# Patient Record
Sex: FEMALE | Race: WHITE | Employment: FULL TIME | URBAN - METROPOLITAN AREA
[De-identification: names, ages, dates, MRNs, and addresses within clinical notes are randomized per-mention and may not be internally consistent; named-entity substitution may affect disease eponyms.]

---

## 2017-01-27 ENCOUNTER — APPOINTMENT (OUTPATIENT)
Dept: LAB | Age: 57
End: 2017-01-27
Attending: FAMILY MEDICINE
Payer: COMMERCIAL

## 2017-01-27 ENCOUNTER — TELEPHONE (OUTPATIENT)
Dept: FAMILY MEDICINE CLINIC | Facility: CLINIC | Age: 57
End: 2017-01-27

## 2017-01-27 PROCEDURE — 84460 ALANINE AMINO (ALT) (SGPT): CPT | Performed by: FAMILY MEDICINE

## 2017-01-27 PROCEDURE — 84450 TRANSFERASE (AST) (SGOT): CPT | Performed by: FAMILY MEDICINE

## 2017-01-27 PROCEDURE — 36415 COLL VENOUS BLD VENIPUNCTURE: CPT | Performed by: FAMILY MEDICINE

## 2017-01-27 PROCEDURE — 82947 ASSAY GLUCOSE BLOOD QUANT: CPT | Performed by: FAMILY MEDICINE

## 2017-01-27 PROCEDURE — 80061 LIPID PANEL: CPT | Performed by: FAMILY MEDICINE

## 2017-01-27 NOTE — TELEPHONE ENCOUNTER
Patient requesting refill for Simvastatin, Please call when ready  Patient states she has enough till Sunday

## 2017-01-28 ENCOUNTER — OFFICE VISIT (OUTPATIENT)
Dept: FAMILY MEDICINE CLINIC | Facility: CLINIC | Age: 57
End: 2017-01-28

## 2017-01-28 VITALS
HEART RATE: 92 BPM | WEIGHT: 136 LBS | SYSTOLIC BLOOD PRESSURE: 99 MMHG | DIASTOLIC BLOOD PRESSURE: 58 MMHG | TEMPERATURE: 97 F | HEIGHT: 62.5 IN | BODY MASS INDEX: 24.4 KG/M2

## 2017-01-28 DIAGNOSIS — J02.9 ACUTE PHARYNGITIS, UNSPECIFIED ETIOLOGY: Primary | ICD-10-CM

## 2017-01-28 DIAGNOSIS — R09.82 PND (POST-NASAL DRIP): ICD-10-CM

## 2017-01-28 DIAGNOSIS — E78.2 MIXED HYPERLIPIDEMIA: ICD-10-CM

## 2017-01-28 DIAGNOSIS — Z85.3 HISTORY OF LEFT BREAST CANCER: ICD-10-CM

## 2017-01-28 PROCEDURE — 99212 OFFICE O/P EST SF 10 MIN: CPT | Performed by: FAMILY MEDICINE

## 2017-01-28 PROCEDURE — 99214 OFFICE O/P EST MOD 30 MIN: CPT | Performed by: FAMILY MEDICINE

## 2017-01-28 RX ORDER — AMOXICILLIN 875 MG/1
875 TABLET, COATED ORAL 2 TIMES DAILY
Qty: 20 TABLET | Refills: 0 | Status: SHIPPED | OUTPATIENT
Start: 2017-01-28 | End: 2017-02-07

## 2017-01-28 RX ORDER — ATORVASTATIN CALCIUM 20 MG/1
20 TABLET, FILM COATED ORAL NIGHTLY
Qty: 90 TABLET | Refills: 1 | Status: SHIPPED | OUTPATIENT
Start: 2017-01-28 | End: 2017-08-28

## 2017-01-28 RX ORDER — FLUTICASONE PROPIONATE 50 MCG
2 SPRAY, SUSPENSION (ML) NASAL DAILY
Qty: 1 BOTTLE | Refills: 0 | Status: SHIPPED | OUTPATIENT
Start: 2017-01-28 | End: 2017-05-28

## 2017-01-28 NOTE — PROGRESS NOTES
Patient ID: Tyron Oppenheim is a 64year old female. HPI  Patient presents with:  Throat Problem: discomfort, burning sensation. Onset: 2-3 weeks     On January 4, 2017 she started radiation due to her left breast cancer.   Few days later she states sh Tonsils are normal.  She does have moderate congestion bilaterally with more crusty nasal mucus on the right side than the left side. Eyes: Conjunctivae are normal.   Neck: Normal range of motion. Neck supple. No thyromegaly present.    Cardiovascular: No persist.  Take medicine (if given) as prescribed. Approach to treatment discussed and patient/family member understands and agrees to plan.            Rach Varela,   1/28/2017

## 2017-02-28 ENCOUNTER — TELEPHONE (OUTPATIENT)
Dept: FAMILY MEDICINE CLINIC | Facility: CLINIC | Age: 57
End: 2017-02-28

## 2017-02-28 NOTE — TELEPHONE ENCOUNTER
Pt is calling want to know if Dr Lizzy kSy can recommend a ENT DR crisostomo is requesting a call back

## 2017-06-13 ENCOUNTER — TELEPHONE (OUTPATIENT)
Dept: INTERNAL MEDICINE CLINIC | Facility: CLINIC | Age: 57
End: 2017-06-13

## 2017-06-13 ENCOUNTER — OFFICE VISIT (OUTPATIENT)
Dept: FAMILY MEDICINE CLINIC | Facility: CLINIC | Age: 57
End: 2017-06-13

## 2017-06-13 VITALS
RESPIRATION RATE: 12 BRPM | HEART RATE: 68 BPM | WEIGHT: 135.63 LBS | DIASTOLIC BLOOD PRESSURE: 66 MMHG | SYSTOLIC BLOOD PRESSURE: 117 MMHG | BODY MASS INDEX: 24.33 KG/M2 | HEIGHT: 62.5 IN | TEMPERATURE: 98 F

## 2017-06-13 DIAGNOSIS — J02.9 ACUTE PHARYNGITIS, UNSPECIFIED ETIOLOGY: Primary | ICD-10-CM

## 2017-06-13 DIAGNOSIS — Z20.9 EXPOSURE TO COMMUNICABLE DISEASE: ICD-10-CM

## 2017-06-13 PROCEDURE — 99213 OFFICE O/P EST LOW 20 MIN: CPT | Performed by: FAMILY MEDICINE

## 2017-06-13 PROCEDURE — 87880 STREP A ASSAY W/OPTIC: CPT | Performed by: FAMILY MEDICINE

## 2017-06-13 RX ORDER — ATORVASTATIN CALCIUM 20 MG/1
20 TABLET, FILM COATED ORAL NIGHTLY
COMMUNITY
End: 2017-08-30

## 2017-06-13 NOTE — PROGRESS NOTES
Patient ID: Francis Cota is a 62year old female.       Telephone Encounter    Francis Cota (MR# [id number])         Telephone Encounter by Ava Osuna RN at 6/13/2017  9:21 AM      Author: Ava Osuna RN Service: (none) Author Type: Brian not sure if it is mumps but just wants to be sure that she is still immune. She has no swelling in her neck or cheeks. She knows she is being overly cautious but just wants my opinion that it is not mumps.          Wt Readings from Last 6 Encounters:  0 oriented to person, place, and time. Patient appears well-developed and well-nourished. No distress. HENT:   Head: Normocephalic.    Right Ear: Tympanic membrane, external ear and ear canal normal.   Left Ear: Tympanic membrane, external ear and ear canal

## 2017-06-13 NOTE — TELEPHONE ENCOUNTER
Actions Requested: prefers expedited appt today, no access. Scheduled 6/14/17.   Pt is asking to be added on  Situation/Background   Problem: right side sore throat, tired   Direct exposure to mumps via friend   Onset: <24 hours   Associated Symptoms: tire hours  * Severe sore throat pain  * Pus on tonsils (back of throat) and swollen neck lymph nodes (\"glands\")  * Earache also present  * Widespread rash (especially chest and abdomen)  * Diabetes mellitus or weak immune system (e.g., HIV positive, cancer c

## 2017-08-28 DIAGNOSIS — E78.2 MIXED HYPERLIPIDEMIA: ICD-10-CM

## 2017-08-30 RX ORDER — ATORVASTATIN CALCIUM 20 MG/1
TABLET, FILM COATED ORAL
Qty: 90 TABLET | Refills: 0 | Status: SHIPPED | OUTPATIENT
Start: 2017-08-30 | End: 2018-01-04

## 2017-08-30 NOTE — TELEPHONE ENCOUNTER
Cholesterol Medications  Protocol Criteria:  · Appointment scheduled in the past 12 months or in the next 3 months  · ALT & LDL on file in the past 12 months  · ALT result < 80  · LDL result <130   Recent Outpatient Visits            2 months ago Acute pha

## 2017-12-22 DIAGNOSIS — E78.2 MIXED HYPERLIPIDEMIA: ICD-10-CM

## 2017-12-29 RX ORDER — ATORVASTATIN CALCIUM 20 MG/1
TABLET, FILM COATED ORAL
Qty: 90 TABLET | Refills: 0 | Status: SHIPPED | OUTPATIENT
Start: 2017-12-29 | End: 2018-01-04

## 2018-01-04 ENCOUNTER — OFFICE VISIT (OUTPATIENT)
Dept: FAMILY MEDICINE CLINIC | Facility: CLINIC | Age: 58
End: 2018-01-04

## 2018-01-04 VITALS
WEIGHT: 138 LBS | DIASTOLIC BLOOD PRESSURE: 73 MMHG | HEIGHT: 62.5 IN | HEART RATE: 71 BPM | TEMPERATURE: 97 F | BODY MASS INDEX: 24.76 KG/M2 | SYSTOLIC BLOOD PRESSURE: 135 MMHG

## 2018-01-04 DIAGNOSIS — E78.2 MIXED HYPERLIPIDEMIA: Primary | ICD-10-CM

## 2018-01-04 DIAGNOSIS — F51.04 PSYCHOPHYSIOLOGICAL INSOMNIA: ICD-10-CM

## 2018-01-04 DIAGNOSIS — Z81.8 FAMILY HISTORY OF BIPOLAR DISORDER: ICD-10-CM

## 2018-01-04 DIAGNOSIS — Z85.3 HISTORY OF LEFT BREAST CANCER: ICD-10-CM

## 2018-01-04 DIAGNOSIS — F41.9 ANXIETY: ICD-10-CM

## 2018-01-04 PROCEDURE — 99214 OFFICE O/P EST MOD 30 MIN: CPT | Performed by: FAMILY MEDICINE

## 2018-01-04 PROCEDURE — 99212 OFFICE O/P EST SF 10 MIN: CPT | Performed by: FAMILY MEDICINE

## 2018-01-04 RX ORDER — RANITIDINE 150 MG/1
150 TABLET ORAL
COMMUNITY
End: 2019-01-15

## 2018-01-04 RX ORDER — ATORVASTATIN CALCIUM 20 MG/1
TABLET, FILM COATED ORAL
Qty: 90 TABLET | Refills: 2 | Status: SHIPPED | OUTPATIENT
Start: 2018-01-04 | End: 2018-06-18

## 2018-01-04 RX ORDER — ANASTROZOLE 1 MG/1
1 TABLET ORAL DAILY
Refills: 0 | COMMUNITY
Start: 2017-11-16 | End: 2020-12-09

## 2018-01-04 RX ORDER — TRAZODONE HYDROCHLORIDE 50 MG/1
TABLET ORAL
Qty: 60 TABLET | Refills: 3 | Status: SHIPPED | OUTPATIENT
Start: 2018-01-04 | End: 2019-01-15

## 2018-01-04 RX ORDER — ALPRAZOLAM 0.25 MG/1
0.25 TABLET ORAL NIGHTLY PRN
Qty: 30 TABLET | Refills: 0 | Status: SHIPPED | OUTPATIENT
Start: 2018-01-04 | End: 2018-03-05

## 2018-01-04 NOTE — PROGRESS NOTES
Patient ID: Val Parr is a 62year old female. HPI  Patient presents with:  Hyperlipidemia  Medication Request  Anxiety    She had cancer in 2016 of the left breast.  She at that time was taking some Xanax to help her sleep when she got anxious. K 4.3 06/01/2016    06/01/2016   CO2 30 06/01/2016   OSMOCALC 296 (H) 06/01/2016       No results found for: Kd Maizes, [address], [address], [address], [address], [address], [address] [facility], [address] [facility], [facility] [address], [facility] [address], [address] [place], [address] [address], [address] [place] [address],[address], [address], [facility] [address], [address] Tab tab  Disp:  Rfl: 0   RaNITidine HCl 150 MG Oral Tab Take 150 mg by mouth.  Disp:  Rfl:    ATORVASTATIN 20 MG Oral Tab TAKE 1 TABLET BY MOUTH NIGHTLY FOR CHOLESTEROL Disp: 90 tablet Rfl: 0   ATORVASTATIN 20 MG Oral Tab TAKE 1 TABLET BY MOUTH NIGHTLY FOR Trina but think she would be wonderful for trazodone 100 and take this every day and see how she does. I think she can stay on her melatonin as well. She is agreeable to try this.   She will see me then in 3 months for a full physical exam a week and see h

## 2018-03-05 DIAGNOSIS — F41.9 ANXIETY: ICD-10-CM

## 2018-03-05 DIAGNOSIS — F51.04 PSYCHOPHYSIOLOGICAL INSOMNIA: ICD-10-CM

## 2018-03-08 ENCOUNTER — TELEPHONE (OUTPATIENT)
Dept: FAMILY MEDICINE CLINIC | Facility: CLINIC | Age: 58
End: 2018-03-08

## 2018-03-08 RX ORDER — ALPRAZOLAM 0.25 MG/1
TABLET ORAL
Qty: 30 TABLET | Refills: 0 | Status: SHIPPED | OUTPATIENT
Start: 2018-03-08 | End: 2018-06-18

## 2018-05-30 DIAGNOSIS — E78.2 MIXED HYPERLIPIDEMIA: ICD-10-CM

## 2018-06-05 RX ORDER — ATORVASTATIN CALCIUM 20 MG/1
TABLET, FILM COATED ORAL
Qty: 90 TABLET | Refills: 0 | Status: SHIPPED | OUTPATIENT
Start: 2018-06-05 | End: 2018-06-18

## 2018-06-05 NOTE — TELEPHONE ENCOUNTER
Refilled times 1 but needs appointment before the next prescription will be filled.  Please call patient and set up a physical exam.

## 2018-06-18 ENCOUNTER — OFFICE VISIT (OUTPATIENT)
Dept: FAMILY MEDICINE CLINIC | Facility: CLINIC | Age: 58
End: 2018-06-18

## 2018-06-18 VITALS
HEART RATE: 77 BPM | WEIGHT: 132 LBS | HEIGHT: 62.5 IN | TEMPERATURE: 98 F | SYSTOLIC BLOOD PRESSURE: 127 MMHG | BODY MASS INDEX: 23.68 KG/M2 | DIASTOLIC BLOOD PRESSURE: 69 MMHG

## 2018-06-18 DIAGNOSIS — Z12.11 SCREENING FOR COLON CANCER: ICD-10-CM

## 2018-06-18 DIAGNOSIS — J06.9 UPPER RESPIRATORY TRACT INFECTION, UNSPECIFIED TYPE: Primary | ICD-10-CM

## 2018-06-18 DIAGNOSIS — F51.04 PSYCHOPHYSIOLOGICAL INSOMNIA: ICD-10-CM

## 2018-06-18 DIAGNOSIS — F41.9 ANXIETY: ICD-10-CM

## 2018-06-18 DIAGNOSIS — E78.2 MIXED HYPERLIPIDEMIA: ICD-10-CM

## 2018-06-18 PROCEDURE — 99212 OFFICE O/P EST SF 10 MIN: CPT | Performed by: FAMILY MEDICINE

## 2018-06-18 PROCEDURE — 99214 OFFICE O/P EST MOD 30 MIN: CPT | Performed by: FAMILY MEDICINE

## 2018-06-18 RX ORDER — GUAIFENESIN, PSEUDOEPHEDRINE HYDROCHLORIDE 600; 60 MG/1; MG/1
1 TABLET, EXTENDED RELEASE ORAL EVERY 12 HOURS
Qty: 20 TABLET | Refills: 0 | Status: SHIPPED | OUTPATIENT
Start: 2018-06-18 | End: 2019-01-15

## 2018-06-18 RX ORDER — AZELASTINE 1 MG/ML
2 SPRAY, METERED NASAL 2 TIMES DAILY
Qty: 1 BOTTLE | Refills: 0 | Status: SHIPPED | OUTPATIENT
Start: 2018-06-18 | End: 2019-01-15

## 2018-06-18 RX ORDER — ALPRAZOLAM 0.25 MG/1
TABLET ORAL
Qty: 30 TABLET | Refills: 0 | Status: SHIPPED | OUTPATIENT
Start: 2018-06-18 | End: 2019-08-13

## 2018-06-18 RX ORDER — ATORVASTATIN CALCIUM 20 MG/1
TABLET, FILM COATED ORAL
Qty: 90 TABLET | Refills: 2 | Status: SHIPPED | OUTPATIENT
Start: 2018-06-18 | End: 2019-08-01

## 2018-06-18 NOTE — PROGRESS NOTES
Patient ID: Sue Thomas is a 62year old female. HPI  Patient presents with: Follow - Up  Cold    2 days ago she started getting sick. She has had a stuffy nose and runny nose.   She is just taking some over-the-counter cold medicine that her hus TABLET BY MOUTH NIGHTLY FOR CHOLESTEROL Disp: 90 tablet Rfl: 0   ALPRAZOLAM 0.25 MG Oral Tab TAKE 1 TABLET BY MOUTH NIGHTLY AS NEEDED FOR SLEEP AND ANXIETY Disp: 30 tablet Rfl: 0   anastrozole 1 MG Oral Tab tab  Disp:  Rfl: 0   RaNITidine HCl 150 MG Oral T Patient has a normal mood and affect. Vitals reviewed.          ASSESSMENT/PLAN:     Diagnoses and all orders for this visit:    Upper respiratory tract infection, unspecified type  -     Azelastine HCl 0.1 % Nasal Solution; 2 sprays by Nasal route 2 (two

## 2018-07-27 ENCOUNTER — TELEPHONE (OUTPATIENT)
Dept: GASTROENTEROLOGY | Facility: CLINIC | Age: 58
End: 2018-07-27

## 2018-07-27 ENCOUNTER — OFFICE VISIT (OUTPATIENT)
Dept: GASTROENTEROLOGY | Facility: CLINIC | Age: 58
End: 2018-07-27
Payer: COMMERCIAL

## 2018-07-27 VITALS
HEART RATE: 70 BPM | SYSTOLIC BLOOD PRESSURE: 122 MMHG | DIASTOLIC BLOOD PRESSURE: 73 MMHG | BODY MASS INDEX: 24.48 KG/M2 | WEIGHT: 133 LBS | HEIGHT: 62 IN

## 2018-07-27 DIAGNOSIS — R12 HEARTBURN: ICD-10-CM

## 2018-07-27 DIAGNOSIS — Z12.11 ENCOUNTER FOR SCREENING COLONOSCOPY: Primary | ICD-10-CM

## 2018-07-27 PROCEDURE — 99212 OFFICE O/P EST SF 10 MIN: CPT | Performed by: PHYSICIAN ASSISTANT

## 2018-07-27 PROCEDURE — 99243 OFF/OP CNSLTJ NEW/EST LOW 30: CPT | Performed by: PHYSICIAN ASSISTANT

## 2018-07-27 RX ORDER — MELATONIN 10 MG
20 CAPSULE ORAL
COMMUNITY
End: 2020-01-21

## 2018-07-27 NOTE — H&P
[address] [facility] [address] Gastroenterology                                                                                                  Clinic History and Physical     [facility]:  \"Allergies. \"   • Osteoporosis screening 02-    Per [employer]      Past Surgical History:  No date: OTHER SURGICAL HISTORY      Comment: lumpectomy  left side    Family Hx: No family history on file.    Social History: Smoking status: bid to arms and legs Disp: 475 mL Rfl: 3   Emollient (KELI RETINOL CORREXION NIGHT) External Cream Use qhs for chest Disp: 30 mL Rfl: 3       Allergies:  No Known Allergies    ROS:   CONSTITUTIONAL:  negative for fevers, rigors  EYES:  negative for diplopia appropriate to proceed with screening colonoscopy. Patient is currently asymptomatic and denies diarrhea, hematochezia, thin-stools or weight loss.  We discussed risks/benefits/alternatives to procedure, including CT colonography and stool testing, they wan

## 2018-07-27 NOTE — PATIENT INSTRUCTIONS
1. Schedule colonoscopy with Dr. Rafat Guerrier or William Naidu with MAC sedation @ [address] [facility] or [facility] on a Monday. 2.  bowel prep from pharmacy - I have prescribed Suprep. This is a smaller volume preparation.  If this is too expensive, however, please call my offic

## 2018-07-27 NOTE — TELEPHONE ENCOUNTER
Scheduled for:  Colonoscopy [address]   Provider Name: Dr. Anthony Ayoub  Date:  8/27/18  Location:  [facility]  Sedation:  MAC  Time:  2:00 pm, (pt is aware that [facility] will call the day before to confirm arrival time)  Prep: Suprep  Meds/Allergies Reconciled?:  LEONIDES Razo-

## 2018-08-22 ENCOUNTER — TELEPHONE (OUTPATIENT)
Dept: GASTROENTEROLOGY | Facility: CLINIC | Age: 58
End: 2018-08-22

## 2018-08-22 NOTE — TELEPHONE ENCOUNTER
Pt indicates she rcvd vm today, but deleted in error and did not hear the complete message/recording on pre-register at website, pls call at:[phone],thanks.   *GARRET Marcelo

## 2018-08-22 NOTE — TELEPHONE ENCOUNTER
Patient contacted and advised not sure who called her. Our office did not.    [facility] number given to patient to see if they are the ones that called her [phone]

## 2018-08-27 ENCOUNTER — LAB REQUISITION (OUTPATIENT)
Dept: LAB | Facility: HOSPITAL | Age: 58
End: 2018-08-27
Payer: COMMERCIAL

## 2018-08-27 DIAGNOSIS — Z01.89 ENCOUNTER FOR OTHER SPECIFIED SPECIAL EXAMINATIONS: ICD-10-CM

## 2018-08-27 PROCEDURE — 88305 TISSUE EXAM BY PATHOLOGIST: CPT | Performed by: INTERNAL MEDICINE

## 2018-09-04 ENCOUNTER — TELEPHONE (OUTPATIENT)
Dept: GASTROENTEROLOGY | Facility: CLINIC | Age: 58
End: 2018-09-04

## 2018-09-04 NOTE — TELEPHONE ENCOUNTER
I mailed out colonoscopy results letter to pt  Updated health maintenance  Entered into 3 yr recall  Recall colon in 3 years per.  Colon done 8/27/18    GI staff: please place recall for colonoscopy in 3 years

## 2019-01-15 PROCEDURE — 87624 HPV HI-RISK TYP POOLED RSLT: CPT | Performed by: OBSTETRICS & GYNECOLOGY

## 2019-01-15 PROCEDURE — 88175 CYTOPATH C/V AUTO FLUID REDO: CPT | Performed by: OBSTETRICS & GYNECOLOGY

## 2019-02-12 ENCOUNTER — NURSE TRIAGE (OUTPATIENT)
Dept: OTHER | Age: 59
End: 2019-02-12

## 2019-02-12 ENCOUNTER — OFFICE VISIT (OUTPATIENT)
Dept: INTERNAL MEDICINE CLINIC | Facility: CLINIC | Age: 59
End: 2019-02-12
Payer: COMMERCIAL

## 2019-02-12 VITALS
DIASTOLIC BLOOD PRESSURE: 74 MMHG | SYSTOLIC BLOOD PRESSURE: 125 MMHG | HEART RATE: 83 BPM | BODY MASS INDEX: 24.4 KG/M2 | WEIGHT: 136 LBS | HEIGHT: 62.5 IN | TEMPERATURE: 98 F

## 2019-02-12 DIAGNOSIS — K14.0 TRANSIENT LINGUAL PAPILLITIS: Primary | ICD-10-CM

## 2019-02-12 PROCEDURE — 99212 OFFICE O/P EST SF 10 MIN: CPT | Performed by: INTERNAL MEDICINE

## 2019-02-12 NOTE — TELEPHONE ENCOUNTER
Action Requested: Summary for Provider     []  Critical Lab, Recommendations Needed  [] Need Additional Advice  []   FYI    []   Need Orders  [] Need Medications Sent to Pharmacy  []  Other     SUMMARY:Appointment made with Dr Santi Del Toro today 2/12/19 at 3:30

## 2019-02-12 NOTE — PROGRESS NOTES
HPI:    Patient ID: Lashanda Phan is a 62year old female.   Chief Complaint: Dental Problem (dental) (Pt is having a discomfort in the back of throat, feels bumps in the back of her tongue, feels the discomfort worse at night)      HPI  Small bumps base • Osteoporosis screening 02-    Per [employer]     Patient Active Problem List:     Mixed hyperlipidemia     Vitiligo     History of left breast cancer        Social History:      reports that she quit smoking about 8 years ago.  Her smoking use include Neck: Neck supple. Cardiovascular: Normal rate and regular rhythm. Edema not present. Pulmonary/Chest: Effort normal and breath sounds normal. No respiratory distress. She has no wheezes. Abdominal: Soft.  Bowel sounds are normal. She exhibits no d

## 2019-08-01 DIAGNOSIS — E78.2 MIXED HYPERLIPIDEMIA: ICD-10-CM

## 2019-08-01 RX ORDER — ATORVASTATIN CALCIUM 20 MG/1
TABLET, FILM COATED ORAL
Qty: 90 TABLET | Refills: 0 | Status: SHIPPED | OUTPATIENT
Start: 2019-08-01 | End: 2019-11-02

## 2019-08-13 ENCOUNTER — LAB ENCOUNTER (OUTPATIENT)
Dept: LAB | Age: 59
End: 2019-08-13
Attending: FAMILY MEDICINE
Payer: COMMERCIAL

## 2019-08-13 ENCOUNTER — OFFICE VISIT (OUTPATIENT)
Dept: FAMILY MEDICINE CLINIC | Facility: CLINIC | Age: 59
End: 2019-08-13
Payer: COMMERCIAL

## 2019-08-13 VITALS
HEART RATE: 59 BPM | SYSTOLIC BLOOD PRESSURE: 125 MMHG | HEIGHT: 62.5 IN | DIASTOLIC BLOOD PRESSURE: 74 MMHG | WEIGHT: 137 LBS | TEMPERATURE: 97 F | RESPIRATION RATE: 18 BRPM | BODY MASS INDEX: 24.58 KG/M2

## 2019-08-13 DIAGNOSIS — L57.8 SUN-DAMAGED SKIN: ICD-10-CM

## 2019-08-13 DIAGNOSIS — F51.04 PSYCHOPHYSIOLOGICAL INSOMNIA: ICD-10-CM

## 2019-08-13 DIAGNOSIS — L81.4 SOLAR LENTIGO: ICD-10-CM

## 2019-08-13 DIAGNOSIS — L81.8 IDIOPATHIC GUTTATE HYPOMELANOSIS: ICD-10-CM

## 2019-08-13 DIAGNOSIS — Z00.00 ADULT GENERAL MEDICAL EXAM: ICD-10-CM

## 2019-08-13 DIAGNOSIS — F41.9 ANXIETY: ICD-10-CM

## 2019-08-13 DIAGNOSIS — Z00.00 ADULT GENERAL MEDICAL EXAM: Primary | ICD-10-CM

## 2019-08-13 DIAGNOSIS — J30.89 OTHER ALLERGIC RHINITIS: ICD-10-CM

## 2019-08-13 DIAGNOSIS — L30.9 DERMATITIS: ICD-10-CM

## 2019-08-13 LAB
ALBUMIN SERPL-MCNC: 4.2 G/DL (ref 3.4–5)
ALBUMIN/GLOB SERPL: 1.1 {RATIO} (ref 1–2)
ALP LIVER SERPL-CCNC: 81 U/L (ref 46–118)
ALT SERPL-CCNC: 57 U/L (ref 13–56)
ANION GAP SERPL CALC-SCNC: 5 MMOL/L (ref 0–18)
AST SERPL-CCNC: 26 U/L (ref 15–37)
BASOPHILS # BLD AUTO: 0.03 X10(3) UL (ref 0–0.2)
BASOPHILS NFR BLD AUTO: 0.6 %
BILIRUB SERPL-MCNC: 0.5 MG/DL (ref 0.1–2)
BUN BLD-MCNC: 15 MG/DL (ref 7–18)
BUN/CREAT SERPL: 21.7 (ref 10–20)
CALCIUM BLD-MCNC: 9.9 MG/DL (ref 8.5–10.1)
CHLORIDE SERPL-SCNC: 105 MMOL/L (ref 98–112)
CHOLEST SMN-MCNC: 206 MG/DL (ref ?–200)
CO2 SERPL-SCNC: 30 MMOL/L (ref 21–32)
CREAT BLD-MCNC: 0.69 MG/DL (ref 0.55–1.02)
DEPRECATED RDW RBC AUTO: 46.9 FL (ref 35.1–46.3)
EOSINOPHIL # BLD AUTO: 0.11 X10(3) UL (ref 0–0.7)
EOSINOPHIL NFR BLD AUTO: 2.4 %
ERYTHROCYTE [DISTWIDTH] IN BLOOD BY AUTOMATED COUNT: 13.2 % (ref 11–15)
GLOBULIN PLAS-MCNC: 3.9 G/DL (ref 2.8–4.4)
GLUCOSE BLD-MCNC: 105 MG/DL (ref 70–99)
HCT VFR BLD AUTO: 43.1 % (ref 35–48)
HDLC SERPL-MCNC: 77 MG/DL (ref 40–59)
HGB BLD-MCNC: 14.1 G/DL (ref 12–16)
IMM GRANULOCYTES # BLD AUTO: 0.01 X10(3) UL (ref 0–1)
IMM GRANULOCYTES NFR BLD: 0.2 %
LDLC SERPL CALC-MCNC: 104 MG/DL (ref ?–100)
LYMPHOCYTES # BLD AUTO: 1.54 X10(3) UL (ref 1–4)
LYMPHOCYTES NFR BLD AUTO: 33.2 %
M PROTEIN MFR SERPL ELPH: 8.1 G/DL (ref 6.4–8.2)
MCH RBC QN AUTO: 31.4 PG (ref 26–34)
MCHC RBC AUTO-ENTMCNC: 32.7 G/DL (ref 31–37)
MCV RBC AUTO: 96 FL (ref 80–100)
MONOCYTES # BLD AUTO: 0.5 X10(3) UL (ref 0.1–1)
MONOCYTES NFR BLD AUTO: 10.8 %
NEUTROPHILS # BLD AUTO: 2.45 X10 (3) UL (ref 1.5–7.7)
NEUTROPHILS # BLD AUTO: 2.45 X10(3) UL (ref 1.5–7.7)
NEUTROPHILS NFR BLD AUTO: 52.8 %
NONHDLC SERPL-MCNC: 129 MG/DL (ref ?–130)
OSMOLALITY SERPL CALC.SUM OF ELEC: 291 MOSM/KG (ref 275–295)
PATIENT FASTING: YES
PATIENT FASTING: YES
PLATELET # BLD AUTO: 371 10(3)UL (ref 150–450)
POTASSIUM SERPL-SCNC: 4.3 MMOL/L (ref 3.5–5.1)
RBC # BLD AUTO: 4.49 X10(6)UL (ref 3.8–5.3)
SODIUM SERPL-SCNC: 140 MMOL/L (ref 136–145)
TRIGL SERPL-MCNC: 126 MG/DL (ref 30–149)
TSI SER-ACNC: 0.52 MIU/ML (ref 0.36–3.74)
VLDLC SERPL CALC-MCNC: 25 MG/DL (ref 0–30)
WBC # BLD AUTO: 4.6 X10(3) UL (ref 4–11)

## 2019-08-13 PROCEDURE — 80061 LIPID PANEL: CPT

## 2019-08-13 PROCEDURE — 36415 COLL VENOUS BLD VENIPUNCTURE: CPT

## 2019-08-13 PROCEDURE — 85025 COMPLETE CBC W/AUTO DIFF WBC: CPT

## 2019-08-13 PROCEDURE — 99396 PREV VISIT EST AGE 40-64: CPT | Performed by: FAMILY MEDICINE

## 2019-08-13 PROCEDURE — 80053 COMPREHEN METABOLIC PANEL: CPT

## 2019-08-13 PROCEDURE — 84443 ASSAY THYROID STIM HORMONE: CPT

## 2019-08-13 RX ORDER — ALPRAZOLAM 0.25 MG/1
TABLET ORAL
Qty: 30 TABLET | Refills: 0 | Status: SHIPPED | OUTPATIENT
Start: 2019-08-13 | End: 2019-10-09

## 2019-08-13 RX ORDER — CLOTRIMAZOLE AND BETAMETHASONE DIPROPIONATE 10; .64 MG/G; MG/G
1 CREAM TOPICAL 2 TIMES DAILY PRN
Qty: 1 TUBE | Refills: 0 | Status: CANCELLED | OUTPATIENT
Start: 2019-08-13

## 2019-08-13 NOTE — PROGRESS NOTES
Patient ID: Inna Pedersen is a 61year old female. HPI  Patient presents with:  Physical: routine px. Pt is  and does not smoke. She works part-time as a  at her 's work. Hx of breast cancer.  Saw gynecology in January, BUNCREA 14.0 06/01/2016    ANIONGAP 6 06/01/2016    GFRAA >60 06/01/2016    GFRNAA >60 06/01/2016    CA 9.8 06/01/2016     06/01/2016    K 4.3 06/01/2016     06/01/2016    CO2 30 06/01/2016    OSMOCALC 296 (H) 06/01/2016       Lab Results   Com [facility]:  \"Allergies. \"   • Osteoporosis screening 02-    Per NextGen       Past Surgical History:   Procedure Laterality Date   • OTHER SURGICAL HISTORY Left 11/2016    lumpectomy    • OTHER SURGICAL HISTORY Right 11/2014    breast cyst   • OTHER (Coffee, Tea) 1 cup daily        Occupational Exposure: Not Asked        Hobby Hazards: Not Asked        Sleep Concern: Not Asked        Stress Concern: Not Asked        Weight Concern: Not Asked        Special Diet: Not Asked        Back Care: Not A to person, place, and time. She has normal reflexes. No cranial nerve deficit. Skin: Skin is warm and dry. Solar lentigos on the arms and sun damaged skin. Lower legs: No edema of the legs bilaterally.   Psychiatric: She has a normal mood and affect   V Apply 1 Application topically 2 (two) times daily as needed. The rash on the arms is not fungal in nature in my opinion showed no need for the clotrimazole.         Referrals (if applicable)  Orders Placed This Encounter      Derm- Dr Hellen Estrada

## 2019-08-13 NOTE — PATIENT INSTRUCTIONS
GENERIC ALLEGRA 180 mg    Get over-the-counter Allegra 180 mg but just get the generic which is Fexofenadine 180 mg and take daily. It does not make you tired.         When you decide to get a tetanus

## 2019-10-08 DIAGNOSIS — F51.04 PSYCHOPHYSIOLOGICAL INSOMNIA: ICD-10-CM

## 2019-10-08 DIAGNOSIS — F41.9 ANXIETY: ICD-10-CM

## 2019-10-08 NOTE — TELEPHONE ENCOUNTER
Current Outpatient Medications:   •  ALPRAZolam 0.25 MG Oral Tab, TAKE 1 TABLET BY MOUTH NIGHTLY AS NEEDED FOR SLEEP AND ANXIETY, Disp: 30 tablet, Rfl: 0

## 2019-10-09 RX ORDER — ALPRAZOLAM 0.25 MG/1
TABLET ORAL
Qty: 30 TABLET | Refills: 0 | Status: SHIPPED | OUTPATIENT
Start: 2019-10-09 | End: 2019-12-18

## 2019-10-09 NOTE — TELEPHONE ENCOUNTER
Controlled medication pending for review. Please change to phone in, fax, or print script if not being sent electronically.     Last Rx: 8/13/19  LOV: 8/13/19    Routed to covering provider (Dr. Olya Mclain) for review as per Dr. Marleen Melendrez out of office instruct

## 2019-11-02 DIAGNOSIS — E78.2 MIXED HYPERLIPIDEMIA: ICD-10-CM

## 2019-11-03 RX ORDER — ATORVASTATIN CALCIUM 20 MG/1
TABLET, FILM COATED ORAL
Qty: 90 TABLET | Refills: 1 | Status: SHIPPED | OUTPATIENT
Start: 2019-11-03 | End: 2019-12-23

## 2019-12-11 DIAGNOSIS — E78.2 MIXED HYPERLIPIDEMIA: ICD-10-CM

## 2019-12-11 RX ORDER — ATORVASTATIN CALCIUM 20 MG/1
TABLET, FILM COATED ORAL
Qty: 90 TABLET | Refills: 0 | OUTPATIENT
Start: 2019-12-11

## 2019-12-18 DIAGNOSIS — F51.04 PSYCHOPHYSIOLOGICAL INSOMNIA: ICD-10-CM

## 2019-12-18 DIAGNOSIS — F41.9 ANXIETY: ICD-10-CM

## 2019-12-18 NOTE — TELEPHONE ENCOUNTER
Refill addressed  atorvastatin 20 MG Oral Tab 90 tablet 1 11/3/2019     Sig: TAKE 1 TABLET BY MOUTH EVERY NIGHT FOR CHOLESTEROL    Sent to pharmacy as:  Atorvastatin Calcium 20 MG Oral Tablet (LIPITOR)    Notes to Pharmacy: **Patient requests 90 days supply

## 2019-12-18 NOTE — TELEPHONE ENCOUNTER
Patient is going out of town and is requesting medication listed below       Current Outpatient Medications   Medication Sig Dispense Refill   • atorvastatin 20 MG Oral Tab TAKE 1 TABLET BY MOUTH EVERY NIGHT FOR CHOLESTEROL 90 tablet 1

## 2019-12-23 RX ORDER — ALPRAZOLAM 0.25 MG/1
TABLET ORAL
Qty: 30 TABLET | Refills: 0 | Status: SHIPPED | OUTPATIENT
Start: 2019-12-23 | End: 2020-01-21

## 2019-12-23 RX ORDER — ATORVASTATIN CALCIUM 20 MG/1
TABLET, FILM COATED ORAL
Qty: 90 TABLET | Refills: 1 | Status: SHIPPED | OUTPATIENT
Start: 2019-12-23 | End: 2020-07-11

## 2019-12-23 NOTE — TELEPHONE ENCOUNTER
Refill passed per [facility] protocol.   Cholesterol Medications  Protocol Criteria:  · Appointment scheduled in the past 12 months or in the next 3 months  · ALT & LDL on file in the past 12 months  · ALT result < 80  · LDL result <130   Recent Outpat

## 2019-12-23 NOTE — TELEPHONE ENCOUNTER
Patient has made an appointment for prescription refills. Asking Atorvastatin be sent to pharmacy as she is going out of town.

## 2019-12-28 ENCOUNTER — OFFICE VISIT (OUTPATIENT)
Dept: FAMILY MEDICINE CLINIC | Facility: CLINIC | Age: 59
End: 2019-12-28
Payer: COMMERCIAL

## 2019-12-28 ENCOUNTER — NURSE TRIAGE (OUTPATIENT)
Dept: FAMILY MEDICINE CLINIC | Facility: CLINIC | Age: 59
End: 2019-12-28

## 2019-12-28 VITALS
BODY MASS INDEX: 24.76 KG/M2 | WEIGHT: 138 LBS | DIASTOLIC BLOOD PRESSURE: 66 MMHG | HEIGHT: 62.5 IN | SYSTOLIC BLOOD PRESSURE: 126 MMHG | TEMPERATURE: 97 F | HEART RATE: 72 BPM

## 2019-12-28 DIAGNOSIS — S39.012A STRAIN OF LUMBAR REGION, INITIAL ENCOUNTER: ICD-10-CM

## 2019-12-28 DIAGNOSIS — M54.50 ACUTE BILATERAL LOW BACK PAIN WITHOUT SCIATICA: Primary | ICD-10-CM

## 2019-12-28 PROCEDURE — 99214 OFFICE O/P EST MOD 30 MIN: CPT | Performed by: FAMILY MEDICINE

## 2019-12-28 RX ORDER — CYCLOBENZAPRINE HCL 10 MG
10 TABLET ORAL 3 TIMES DAILY PRN
Qty: 60 TABLET | Refills: 1 | Status: SHIPPED | OUTPATIENT
Start: 2019-12-28 | End: 2020-01-17

## 2019-12-28 RX ORDER — DICLOFENAC SODIUM 75 MG/1
75 TABLET, DELAYED RELEASE ORAL 2 TIMES DAILY
Qty: 42 TABLET | Refills: 0 | Status: SHIPPED | OUTPATIENT
Start: 2019-12-28 | End: 2020-01-21

## 2019-12-28 NOTE — PROGRESS NOTES
Patient ID: Aurea Joy is a 61year old female.     HPI  Patient presents with:  Back Pain    Telephone message from 10:10AM today 12/28/19:  SUMMARY: Luis Alfredo Stephens pt stated that last night she bend down to carry a [employer] box and then she developed l Encounters:  12/28/19 : 24.84 kg/m²  08/13/19 : 24.66 kg/m²  02/12/19 : 24.48 kg/m²  01/15/19 : 25.24 kg/m²  07/27/18 : 24.33 kg/m²  06/18/18 : 23.76 kg/m²      BP Readings from Last 6 Encounters:  12/28/19 : 126/66  08/13/19 : 125/74  02/12/19 : 125/74  0 needed. 1 Tube 0   • Melatonin 10 MG Oral Cap Take 20 mg by mouth. • anastrozole 1 MG Oral Tab tab Take 1 mg by mouth daily.     0   • Emollient (KELI RETINOL CORREXION NIGHT) External Cream Use qhs for chest 30 mL 3     Allergies:No Known Allergies   PH diclofenac and then cyclobenzaprine. Her and her daughter both no side effects of the medications. Strain of lumbar region, initial encounter  -     Diclofenac Sodium 75 MG Oral Tab EC;  Take 1 tablet (75 mg total) by mouth 2 (two) times daily for 21 days

## 2019-12-28 NOTE — TELEPHONE ENCOUNTER
Per Catia Peter he can see pt today for her back pain.  She will see him at 10:30  Future Appointments   Date Time Provider Alex Christianson   12/28/2019 10:30 AM Francisco Pham, DO [facility]   1/14/2020 10:00 AM Francisco Pham, DO [facility]

## 2019-12-28 NOTE — PATIENT INSTRUCTIONS
Ice your back 2 or 3 times daily for 15 minutes. Do pelvic tilt exercises and even try to pull your knees up your chest to help stretch out some of the low back. Take medications as directed.

## 2019-12-28 NOTE — TELEPHONE ENCOUNTER
Action Requested: Summary for Provider     []  Critical Lab, Recommendations Needed  [] Need Additional Advice  []   FYI    []   Need Orders  [x] Need Medications Sent to Pharmacy  []  Other     SUMMARY: Fred Gomez pt stated that last night she bend down to

## 2019-12-30 DIAGNOSIS — S39.012A STRAIN OF LUMBAR REGION, INITIAL ENCOUNTER: ICD-10-CM

## 2019-12-30 DIAGNOSIS — M54.50 ACUTE BILATERAL LOW BACK PAIN WITHOUT SCIATICA: ICD-10-CM

## 2019-12-30 NOTE — TELEPHONE ENCOUNTER
Diclofenac Sodium 75 MG Oral Tab EC, Take 1 tablet (75 mg total) by mouth 2 (two) times daily for 21 days. Take with meals. (for pain/inflammation). , Disp: 42 tablet, Rfl: 0

## 2019-12-31 RX ORDER — DICLOFENAC SODIUM 75 MG/1
75 TABLET, DELAYED RELEASE ORAL 2 TIMES DAILY
Qty: 42 TABLET | Refills: 0 | OUTPATIENT
Start: 2019-12-31 | End: 2020-01-21

## 2020-01-21 ENCOUNTER — OFFICE VISIT (OUTPATIENT)
Dept: FAMILY MEDICINE CLINIC | Facility: CLINIC | Age: 60
End: 2020-01-21
Payer: COMMERCIAL

## 2020-01-21 ENCOUNTER — HOSPITAL ENCOUNTER (OUTPATIENT)
Dept: GENERAL RADIOLOGY | Age: 60
Discharge: HOME | End: 2020-01-21
Attending: FAMILY MEDICINE
Payer: COMMERCIAL

## 2020-01-21 VITALS
TEMPERATURE: 97 F | BODY MASS INDEX: 24.94 KG/M2 | HEIGHT: 62.5 IN | SYSTOLIC BLOOD PRESSURE: 125 MMHG | DIASTOLIC BLOOD PRESSURE: 70 MMHG | WEIGHT: 139 LBS | HEART RATE: 78 BPM

## 2020-01-21 DIAGNOSIS — M54.50 CHRONIC MIDLINE LOW BACK PAIN WITHOUT SCIATICA: ICD-10-CM

## 2020-01-21 DIAGNOSIS — G89.29 CHRONIC MIDLINE LOW BACK PAIN WITHOUT SCIATICA: ICD-10-CM

## 2020-01-21 DIAGNOSIS — M54.50 CHRONIC MIDLINE LOW BACK PAIN WITHOUT SCIATICA: Primary | ICD-10-CM

## 2020-01-21 DIAGNOSIS — F51.04 PSYCHOPHYSIOLOGICAL INSOMNIA: ICD-10-CM

## 2020-01-21 DIAGNOSIS — F41.9 ANXIETY: ICD-10-CM

## 2020-01-21 DIAGNOSIS — M54.2 BILATERAL POSTERIOR NECK PAIN: ICD-10-CM

## 2020-01-21 DIAGNOSIS — M62.838 TRAPEZIUS MUSCLE SPASM: ICD-10-CM

## 2020-01-21 DIAGNOSIS — G44.209 TENSION HEADACHE: ICD-10-CM

## 2020-01-21 DIAGNOSIS — G89.29 CHRONIC MIDLINE LOW BACK PAIN WITHOUT SCIATICA: Primary | ICD-10-CM

## 2020-01-21 PROCEDURE — 99214 OFFICE O/P EST MOD 30 MIN: CPT | Performed by: FAMILY MEDICINE

## 2020-01-21 PROCEDURE — 72110 X-RAY EXAM L-2 SPINE 4/>VWS: CPT | Performed by: FAMILY MEDICINE

## 2020-01-21 RX ORDER — ALPRAZOLAM 0.25 MG/1
TABLET ORAL
Qty: 30 TABLET | Refills: 1 | Status: SHIPPED | OUTPATIENT
Start: 2020-01-21 | End: 2020-06-05

## 2020-01-21 RX ORDER — DICLOFENAC SODIUM 75 MG/1
75 TABLET, DELAYED RELEASE ORAL 2 TIMES DAILY
Qty: 42 TABLET | Refills: 0 | COMMUNITY
Start: 2020-01-21 | End: 2020-07-11

## 2020-01-21 NOTE — PATIENT INSTRUCTIONS
I will restart the diclofenac with food for inflammation and pain. You can take the cyclobenzaprine then after dinner and then continue the Xanax at bedtime to help you sleep and stop your mind from overthinking. Start physical therapy.   See me in 6 week

## 2020-01-21 NOTE — PROGRESS NOTES
Patient ID: Gucci Santiago is a 61year old female. HPI  Patient presents with:  Medication Follow-Up    Last seen by me on 12/28/19 with lower back pain. Started on diclofenac and cyclobenzaprine.  She did not take diclofenac as she did not like the Value Date    WBC 4.6 08/13/2019    RBC 4.49 08/13/2019    HGB 14.1 08/13/2019    HCT 43.1 08/13/2019    .0 08/13/2019    MCV 96.0 08/13/2019    MCH 31.4 08/13/2019    MCHC 32.7 08/13/2019    RDW 13.2 08/13/2019    NEPRELIM 2.45 08/13/2019    NEPERC (61.7 kg)  01/15/19 : 138 lb (62.6 kg)  07/27/18 : 133 lb (60.3 kg)      BMI Readings from Last 6 Encounters:  01/21/20 : 25.02 kg/m²  12/28/19 : 24.84 kg/m²  08/13/19 : 24.66 kg/m²  02/12/19 : 24.48 kg/m²  01/15/19 : 25.24 kg/m²  07/27/18 : 24.33 kg/m² anastrozole 1 MG Oral Tab tab Take 1 mg by mouth daily. 0   • triamcinolone acetonide 0.1 % External Cream Apply topically 2 (two) times daily as needed. For dermatitis or itching of the skin on the arms and legs.  (Patient not taking: Reported on 1/21/2 bilaterally    ----------------------------------------------------------------     Vitals reviewed.     Blood pressure 125/70, pulse 78, temperature 97.2 °F (36.2 °C), temperature source Oral, height 5' 2.5\" (1.588 m), weight 139 lb (63 kg), not currently Priority:Routine          Referral Type:Rehab Services          Requested Specialty:Physical Therapy        Follow up if symptoms persist.  Take medicine (if given) as prescribed.   Approach to treatment discussed and patient/family member understands and a

## 2020-01-27 ENCOUNTER — TELEPHONE (OUTPATIENT)
Dept: FAMILY MEDICINE CLINIC | Facility: CLINIC | Age: 60
End: 2020-01-27

## 2020-01-27 NOTE — TELEPHONE ENCOUNTER
Per patient she lost her Order for her Physical Therapy and would like to fax it to Baljeet Means @fax [phone] patient is there now.

## 2020-03-24 ENCOUNTER — NURSE TRIAGE (OUTPATIENT)
Dept: OTHER | Age: 60
End: 2020-03-24

## 2020-03-24 DIAGNOSIS — J06.9 ACUTE URI: Primary | ICD-10-CM

## 2020-03-24 PROCEDURE — 99441 PHONE E/M BY PHYS 5-10 MIN: CPT | Performed by: FAMILY MEDICINE

## 2020-03-24 NOTE — TELEPHONE ENCOUNTER
Action Requested: Summary for Provider     []  Critical Lab, Recommendations Needed  [x] Need Additional Advice  []   FYI    []   Need Orders  [] Need Medications Sent to Pharmacy  []  Other     SUMMARY:     Per the system protocol please contact patient

## 2020-03-25 NOTE — TELEPHONE ENCOUNTER
TELEPHONE VISIT PROGRESS NOTE  Todays date: 3/24/2020 7:29 PM      Most recent Nurse Triage message/ Mychart message from patient:     Patient calls with sore throat and cough    Due to the COVID-19 emergency implementation plan, this patient's incoming ca Emollient (KELI RETINOL CORREXION NIGHT) External Cream, Use qhs for chest (Patient not taking: Reported on 1/21/2020 ), Disp: 30 mL, Rfl: 3    ALLERGY LIST  No Known Allergies      No examination for this telephone visit due to the coronavirus emergency

## 2020-06-05 ENCOUNTER — TELEPHONE (OUTPATIENT)
Dept: FAMILY MEDICINE CLINIC | Facility: CLINIC | Age: 60
End: 2020-06-05

## 2020-06-05 DIAGNOSIS — F41.9 ANXIETY: ICD-10-CM

## 2020-06-05 DIAGNOSIS — F51.04 PSYCHOPHYSIOLOGICAL INSOMNIA: ICD-10-CM

## 2020-06-05 RX ORDER — ALPRAZOLAM 0.25 MG/1
TABLET ORAL
Qty: 30 TABLET | Refills: 1 | Status: SHIPPED | OUTPATIENT
Start: 2020-06-05 | End: 2020-11-25

## 2020-06-05 NOTE — TELEPHONE ENCOUNTER
Current Outpatient Medications:   •  ALPRAZolam 0.25 MG Oral Tab, TAKE 1 TABLET BY MOUTH EVERY NIGHT AS NEEDED FOR SLEEP OR ANXIETY, Disp: 30 tablet, Rfl: 1

## 2020-07-11 ENCOUNTER — HOSPITAL ENCOUNTER (OUTPATIENT)
Dept: GENERAL RADIOLOGY | Age: 60
Discharge: HOME | End: 2020-07-11
Attending: FAMILY MEDICINE
Payer: COMMERCIAL

## 2020-07-11 ENCOUNTER — OFFICE VISIT (OUTPATIENT)
Dept: FAMILY MEDICINE CLINIC | Facility: CLINIC | Age: 60
End: 2020-07-11
Payer: COMMERCIAL

## 2020-07-11 VITALS
TEMPERATURE: 98 F | DIASTOLIC BLOOD PRESSURE: 72 MMHG | BODY MASS INDEX: 24.69 KG/M2 | WEIGHT: 137.63 LBS | HEIGHT: 62.5 IN | HEART RATE: 72 BPM | SYSTOLIC BLOOD PRESSURE: 120 MMHG

## 2020-07-11 DIAGNOSIS — F51.01 PRIMARY INSOMNIA: ICD-10-CM

## 2020-07-11 DIAGNOSIS — S76.012A STRAIN OF GLUTEUS MEDIUS OF LEFT LOWER EXTREMITY, INITIAL ENCOUNTER: ICD-10-CM

## 2020-07-11 DIAGNOSIS — M25.552 LEFT HIP PAIN: Primary | ICD-10-CM

## 2020-07-11 DIAGNOSIS — E78.2 MIXED HYPERLIPIDEMIA: ICD-10-CM

## 2020-07-11 DIAGNOSIS — M25.552 LEFT HIP PAIN: ICD-10-CM

## 2020-07-11 PROCEDURE — 99214 OFFICE O/P EST MOD 30 MIN: CPT | Performed by: FAMILY MEDICINE

## 2020-07-11 PROCEDURE — 72170 X-RAY EXAM OF PELVIS: CPT | Performed by: FAMILY MEDICINE

## 2020-07-11 RX ORDER — ATORVASTATIN CALCIUM 20 MG/1
TABLET, FILM COATED ORAL
Qty: 90 TABLET | Refills: 1 | Status: SHIPPED | OUTPATIENT
Start: 2020-07-11 | End: 2021-03-04

## 2020-07-11 RX ORDER — ZOLPIDEM TARTRATE 5 MG/1
5 TABLET ORAL NIGHTLY PRN
Qty: 30 TABLET | Refills: 0 | Status: SHIPPED | OUTPATIENT
Start: 2020-07-11 | End: 2020-07-20

## 2020-07-11 RX ORDER — DICLOFENAC SODIUM 75 MG/1
75 TABLET, DELAYED RELEASE ORAL 2 TIMES DAILY
Qty: 42 TABLET | Refills: 0 | Status: SHIPPED | OUTPATIENT
Start: 2020-07-11 | End: 2020-12-09

## 2020-07-11 NOTE — PROGRESS NOTES
Patient ID: Issac Craig is a 61year old female. HPI  Patient presents with:  Pain: hips pain for about 2 months, more at night    Pt has left hip pain for 2 months. Denies injury. She does not remember any contusions to her hip.  The pain is worse chest pain. Gastrointestinal: Negative for abdominal pain. Musculoskeletal: Positive for arthralgias (left hip pain). Skin: Negative for color change. Neurological: Negative for speech difficulty.    Psychiatric/Behavioral: Positive for sleep distur No respiratory distress. Neurological: Patient is alert and oriented to person, place, and time. Skin: Skin is warm. Psychiatry: Normal mood and affect. Hips: Weak gluteus medius on the left. Tenderness over the left ASIS.  No redness or swelling ove Treatment: Evaluate & Treat, Include modalities if therapist feels they are needed              Physician goals: Pain relief, Increased Function, Activities of daily living and Education              Frequency: 2-3 times per week. ....... Gay [employer] Duration: 4-6 week

## 2020-07-13 ENCOUNTER — TELEPHONE (OUTPATIENT)
Dept: FAMILY MEDICINE CLINIC | Facility: CLINIC | Age: 60
End: 2020-07-13

## 2020-07-13 NOTE — TELEPHONE ENCOUNTER
Called and spoke to patient, informed patient about Rx being sent on July 11 patient  rx on July 12.

## 2020-07-20 NOTE — TELEPHONE ENCOUNTER
Patient states she had a reaction last night to the Ambien 5 mg. States she took half at 1:00 am and the other half at 3:00 am.  Patient woke at 9:20 am and was extremely dizzy and lightheaded and needed to rest longer before going to work.   Patient state

## 2020-07-21 ENCOUNTER — NURSE TRIAGE (OUTPATIENT)
Dept: FAMILY MEDICINE CLINIC | Facility: CLINIC | Age: 60
End: 2020-07-21

## 2020-07-21 ENCOUNTER — LAB ENCOUNTER (OUTPATIENT)
Dept: LAB | Age: 60
End: 2020-07-21
Attending: FAMILY MEDICINE
Payer: COMMERCIAL

## 2020-07-21 ENCOUNTER — OFFICE VISIT (OUTPATIENT)
Dept: FAMILY MEDICINE CLINIC | Facility: CLINIC | Age: 60
End: 2020-07-21
Payer: COMMERCIAL

## 2020-07-21 VITALS
TEMPERATURE: 99 F | WEIGHT: 137.38 LBS | HEIGHT: 62.5 IN | DIASTOLIC BLOOD PRESSURE: 64 MMHG | HEART RATE: 73 BPM | SYSTOLIC BLOOD PRESSURE: 127 MMHG | BODY MASS INDEX: 24.65 KG/M2

## 2020-07-21 DIAGNOSIS — R42 VERTIGO: ICD-10-CM

## 2020-07-21 DIAGNOSIS — R42 DIZZINESS: Primary | ICD-10-CM

## 2020-07-21 DIAGNOSIS — M62.838 TRAPEZIUS MUSCLE SPASM: ICD-10-CM

## 2020-07-21 DIAGNOSIS — R51.9 PRESSURE IN HEAD: ICD-10-CM

## 2020-07-21 DIAGNOSIS — R20.2 PARESTHESIA OF HAND, BILATERAL: ICD-10-CM

## 2020-07-21 DIAGNOSIS — F41.9 ANXIETY: ICD-10-CM

## 2020-07-21 DIAGNOSIS — M62.838 CERVICAL PARASPINAL MUSCLE SPASM: ICD-10-CM

## 2020-07-21 DIAGNOSIS — G44.209 TENSION HEADACHE: ICD-10-CM

## 2020-07-21 DIAGNOSIS — R42 DIZZINESS: ICD-10-CM

## 2020-07-21 LAB
ANION GAP SERPL CALC-SCNC: 4 MMOL/L (ref 0–18)
BASOPHILS # BLD AUTO: 0.04 X10(3) UL (ref 0–0.2)
BASOPHILS NFR BLD AUTO: 0.6 %
BUN BLD-MCNC: 9 MG/DL (ref 7–18)
BUN/CREAT SERPL: 13.4 (ref 10–20)
CALCIUM BLD-MCNC: 9.8 MG/DL (ref 8.5–10.1)
CHLORIDE SERPL-SCNC: 106 MMOL/L (ref 98–112)
CO2 SERPL-SCNC: 30 MMOL/L (ref 21–32)
CREAT BLD-MCNC: 0.67 MG/DL (ref 0.55–1.02)
DEPRECATED RDW RBC AUTO: 43.7 FL (ref 35.1–46.3)
EOSINOPHIL # BLD AUTO: 0.09 X10(3) UL (ref 0–0.7)
EOSINOPHIL NFR BLD AUTO: 1.3 %
ERYTHROCYTE [DISTWIDTH] IN BLOOD BY AUTOMATED COUNT: 12.6 % (ref 11–15)
ERYTHROCYTE [SEDIMENTATION RATE] IN BLOOD: 10 MM/HR (ref 0–30)
GLUCOSE BLD-MCNC: 133 MG/DL (ref 70–99)
HCT VFR BLD AUTO: 41.5 % (ref 35–48)
HGB BLD-MCNC: 13.7 G/DL (ref 12–16)
IMM GRANULOCYTES # BLD AUTO: 0.01 X10(3) UL (ref 0–1)
IMM GRANULOCYTES NFR BLD: 0.1 %
LYMPHOCYTES # BLD AUTO: 1.54 X10(3) UL (ref 1–4)
LYMPHOCYTES NFR BLD AUTO: 22.3 %
MCH RBC QN AUTO: 31.4 PG (ref 26–34)
MCHC RBC AUTO-ENTMCNC: 33 G/DL (ref 31–37)
MCV RBC AUTO: 95 FL (ref 80–100)
MONOCYTES # BLD AUTO: 0.55 X10(3) UL (ref 0.1–1)
MONOCYTES NFR BLD AUTO: 8 %
NEUTROPHILS # BLD AUTO: 4.68 X10 (3) UL (ref 1.5–7.7)
NEUTROPHILS # BLD AUTO: 4.68 X10(3) UL (ref 1.5–7.7)
NEUTROPHILS NFR BLD AUTO: 67.7 %
OSMOLALITY SERPL CALC.SUM OF ELEC: 291 MOSM/KG (ref 275–295)
PATIENT FASTING Y/N/NP: NO
PLATELET # BLD AUTO: 351 10(3)UL (ref 150–450)
POTASSIUM SERPL-SCNC: 4.3 MMOL/L (ref 3.5–5.1)
RBC # BLD AUTO: 4.37 X10(6)UL (ref 3.8–5.3)
SODIUM SERPL-SCNC: 140 MMOL/L (ref 136–145)
WBC # BLD AUTO: 6.9 X10(3) UL (ref 4–11)

## 2020-07-21 PROCEDURE — 85025 COMPLETE CBC W/AUTO DIFF WBC: CPT

## 2020-07-21 PROCEDURE — 3074F SYST BP LT 130 MM HG: CPT | Performed by: FAMILY MEDICINE

## 2020-07-21 PROCEDURE — 3008F BODY MASS INDEX DOCD: CPT | Performed by: FAMILY MEDICINE

## 2020-07-21 PROCEDURE — 36415 COLL VENOUS BLD VENIPUNCTURE: CPT

## 2020-07-21 PROCEDURE — 85652 RBC SED RATE AUTOMATED: CPT

## 2020-07-21 PROCEDURE — 80048 BASIC METABOLIC PNL TOTAL CA: CPT

## 2020-07-21 PROCEDURE — 3078F DIAST BP <80 MM HG: CPT | Performed by: FAMILY MEDICINE

## 2020-07-21 PROCEDURE — 99215 OFFICE O/P EST HI 40 MIN: CPT | Performed by: FAMILY MEDICINE

## 2020-07-21 RX ORDER — MECLIZINE HCL 12.5 MG/1
12.5 TABLET ORAL 3 TIMES DAILY PRN
Qty: 30 TABLET | Refills: 0 | Status: SHIPPED | OUTPATIENT
Start: 2020-07-21 | End: 2020-07-31

## 2020-07-21 NOTE — PROGRESS NOTES
Patient ID: Yaz Araujo is a 61year old female.     HPI  Patient presents with:  Dizziness: x 3 days    Telephone encounter from today at 11:11 AM 7/21/2020    Terrance Lord RN   Registered Nurse      Telephone Encounter   Addendum   Encounter Date night and slept off and on throughout the night. She states she got up from the couch yesterday and lost her balance, causing her to fall forward and catch herself. Pt denies vertigo, tinnitus, and hearing loss.  She denies that the room feels like it is sp Systems   Constitutional: Positive for fatigue. Negative for chills and fever. HENT: Positive for sinus pressure. Negative for hearing loss, tinnitus and voice change. Eyes: Positive for visual disturbance.    Respiratory: Negative for chest tightness NEEDED FOR SLEEP OR ANXIETY 30 tablet 1   • clotrimazole-betamethasone 1-0.05 % External Cream Apply 1 Application topically 2 (two) times daily as needed. 1 Tube 0   • anastrozole 1 MG Oral Tab tab Take 1 mg by mouth daily.     0     Allergies:No Known All 99.4 °F (37.4 °C)   TempSrc: Oral Oral   Weight: 137 lb 6.4 oz (62.3 kg)    Height: 5' 2.5\" (1.588 m)               Assessment/Plan:    Diagnoses and all orders for this visit:    Dizziness  -     NEURO - INTERNAL  -     Meclizine HCl 12.5 MG Oral Tab;  Ta Referral Priority:Routine          Referral Type:OFFICE VISIT          Referred to Phill Ocampo MD          Requested Specialty:NEUROLOGY          Number of Visits Requested:3        Follow up if symptoms persist.  Take medicine (if given

## 2020-07-21 NOTE — TELEPHONE ENCOUNTER
I sent in Pamelor instead. It is been around for very long time and quite a safe medication. You can read how she can take it. This helps for sleep as well.

## 2020-07-21 NOTE — PATIENT INSTRUCTIONS
Do not  the nortriptyline (Pamelor) as I do not think the Ambien was causing her dizziness. This sounds much more like vertigo.

## 2020-07-21 NOTE — TELEPHONE ENCOUNTER
Action Requested: Summary for Provider     []  Critical Lab, Recommendations Needed  [] Need Additional Advice  []   FYI    []   Need Orders  [] Need Medications Sent to Pharmacy  []  Other     SUMMARY:   Appointment made for today 7/21/20;  Ok's with

## 2020-07-21 NOTE — TELEPHONE ENCOUNTER
Called and spoke to patient, informed the following per dr Delphine Shepard    I sent in Pamelor instead. It is been around for very long time and quite a safe medication. You can read how she can take it. This helps for sleep as well.     Patient verbally underst

## 2020-07-27 ENCOUNTER — TELEPHONE (OUTPATIENT)
Dept: FAMILY MEDICINE CLINIC | Facility: CLINIC | Age: 60
End: 2020-07-27

## 2020-07-27 DIAGNOSIS — R42 VERTIGO: Primary | ICD-10-CM

## 2020-07-27 NOTE — TELEPHONE ENCOUNTER
Spoke with Tiny Thomas who reports during a session the patient experienced vertigo and she knows that the patient did see Dr. Rafal Belle for it.  Dali Dove would like to treat the patient for the vertigo as well but she needs the diagnosis code to be included

## 2020-07-28 NOTE — TELEPHONE ENCOUNTER
Pt returned call, reviewed Soniad Adwoaodessa CEDENON referral order and that Michelet Michael at [facility] was informed about updated referral.  Pt still has a feeling of head fullness/heaviness. Denies any pain with this sensation.  Was slightly dizzy this am upon getting out of b

## 2020-07-31 ENCOUNTER — TELEPHONE (OUTPATIENT)
Dept: FAMILY MEDICINE CLINIC | Facility: CLINIC | Age: 60
End: 2020-07-31

## 2020-08-11 ENCOUNTER — OFFICE VISIT (OUTPATIENT)
Dept: NEUROLOGY | Facility: CLINIC | Age: 60
End: 2020-08-11
Payer: COMMERCIAL

## 2020-08-11 VITALS — HEIGHT: 62.5 IN | WEIGHT: 135 LBS | BODY MASS INDEX: 24.22 KG/M2

## 2020-08-11 DIAGNOSIS — G47.00 INSOMNIA, UNSPECIFIED TYPE: Primary | ICD-10-CM

## 2020-08-11 DIAGNOSIS — R42 VERTIGO: ICD-10-CM

## 2020-08-11 DIAGNOSIS — R51.9 NEW ONSET HEADACHE: ICD-10-CM

## 2020-08-11 PROCEDURE — 99204 OFFICE O/P NEW MOD 45 MIN: CPT | Performed by: OTHER

## 2020-08-11 PROCEDURE — 3008F BODY MASS INDEX DOCD: CPT | Performed by: OTHER

## 2020-08-11 NOTE — PROGRESS NOTES
Neurology Initial Visit     Referred By: Dr. Sands ref. provider found    Chief Complaint: Patient presents with:  Dizziness: Patient presents today c/o dizziness that started about 2 weeks ago.  She states that she has been getting episodes of dizziness eduard Packs/day   Types: Cigarettes   Quit date: 1/1/2011   Smokeless tobacco: Never Used       Alcohol use Yes 0.0 standard drinks/week   Comment: Wine, socially       Drug use: No       Family History   Problem Relation Age of Onset   • Cancer Maternal Grandmo whisper. IX. Pallet elevates symmetrically. XI. Shoulder shrug is intact  XII.  Tongue is midline    Motor Exam:  Muscle tone normal  No atrophy or fasciculations  Strength- upper extremities 5/5 proximally and distally                  - lower  extremiti immediately if symptoms worsen. Patient verbalized understanding of information given. All questions were answered. All side effects of drugs were discussed. Return to clinic in: Return if symptoms worsen or fail to improve.     Samia Rizzo MD

## 2020-08-18 ENCOUNTER — TELEPHONE (OUTPATIENT)
Dept: NEUROLOGY | Facility: CLINIC | Age: 60
End: 2020-08-18

## 2020-08-18 DIAGNOSIS — G47.00 INSOMNIA, UNSPECIFIED TYPE: Primary | ICD-10-CM

## 2020-08-18 NOTE — TELEPHONE ENCOUNTER
Called sleep center at [facility]. They are able to schedule home sleep study. Spoke to Gerardo who states that once they see the order they will call patient to schedule. Order placed today. Spoke to patient and notified her of above.  She was understanding

## 2020-08-26 ENCOUNTER — TELEPHONE (OUTPATIENT)
Dept: NEUROLOGY | Facility: CLINIC | Age: 60
End: 2020-08-26

## 2020-08-26 DIAGNOSIS — I63.9 CEREBROVASCULAR ACCIDENT (CVA), UNSPECIFIED MECHANISM (HCC): Primary | ICD-10-CM

## 2020-08-26 RX ORDER — ASPIRIN 81 MG/1
81 TABLET ORAL DAILY
Qty: 90 TABLET | Refills: 3 | Status: SHIPPED | OUTPATIENT
Start: 2020-08-26

## 2020-08-26 NOTE — TELEPHONE ENCOUNTER
I spoke with the patient about chronic stroke. Additional testing was ordered. Patiently placed on aspirin. After the testing is done she will follow-up in the clinic for further discussion.   Please help her with organizing the appointment and the other

## 2020-08-26 NOTE — TELEPHONE ENCOUNTER
[employer] for authorization of approval for 2 D echo doppler/bubbles cpt code 07360 & 48 hr cardiac holter monitor cpt code 01991. Authorization is not required. Transaction ID: [id number] Will call Pt. To inform. Pt. informed authorization is not req

## 2020-08-26 NOTE — TELEPHONE ENCOUNTER
S/w pt and provided her with phone numbers for scheduling and instructions on dosage of aspirin she should be purchasing. Explained she should f/u in clinic once she has completed tests, will schedule f/u after she knows when testing will be scheduled.  Pt

## 2020-09-02 ENCOUNTER — HOSPITAL ENCOUNTER (OUTPATIENT)
Dept: CV DIAGNOSTICS | Facility: HOSPITAL | Age: 60
Discharge: HOME | End: 2020-09-02
Attending: Other
Payer: COMMERCIAL

## 2020-09-02 DIAGNOSIS — I63.9 CEREBROVASCULAR ACCIDENT (CVA), UNSPECIFIED MECHANISM (HCC): ICD-10-CM

## 2020-09-02 PROCEDURE — 93225 XTRNL ECG REC<48 HRS REC: CPT | Performed by: OTHER

## 2020-09-02 PROCEDURE — 93227 XTRNL ECG REC<48 HR R&I: CPT | Performed by: OTHER

## 2020-09-02 PROCEDURE — 93306 TTE W/DOPPLER COMPLETE: CPT | Performed by: OTHER

## 2020-09-03 ENCOUNTER — TELEPHONE (OUTPATIENT)
Dept: NEUROLOGY | Facility: CLINIC | Age: 60
End: 2020-09-03

## 2020-09-03 NOTE — TELEPHONE ENCOUNTER
----- Message from Eli Moody MD sent at 9/3/2020 12:15 PM CDT -----  Please let the patient know that echocardiogram didn't show signs of the a. Fib or any holes that could have contributed to the TIA/stroke symptoms.
Spoke to patient and notified her of below. She was understanding. Holter monitor placed yesterday. She has a follow-up on 9/8.  Patient wanted to know if she still needed to take aspirin 81 mg as Dr. Jose Guadalupe Erwin discussed previously and explained that she w
Hpi Title: Evaluation of Skin Lesions

## 2020-09-08 ENCOUNTER — OFFICE VISIT (OUTPATIENT)
Dept: NEUROLOGY | Facility: CLINIC | Age: 60
End: 2020-09-08
Payer: COMMERCIAL

## 2020-09-08 VITALS — SYSTOLIC BLOOD PRESSURE: 122 MMHG | DIASTOLIC BLOOD PRESSURE: 70 MMHG

## 2020-09-08 DIAGNOSIS — R42 VERTIGO: ICD-10-CM

## 2020-09-08 DIAGNOSIS — I63.9 CEREBROVASCULAR ACCIDENT (CVA), UNSPECIFIED MECHANISM (HCC): Primary | ICD-10-CM

## 2020-09-08 PROCEDURE — 3078F DIAST BP <80 MM HG: CPT | Performed by: OTHER

## 2020-09-08 PROCEDURE — 3074F SYST BP LT 130 MM HG: CPT | Performed by: OTHER

## 2020-09-08 PROCEDURE — 99214 OFFICE O/P EST MOD 30 MIN: CPT | Performed by: OTHER

## 2020-09-08 NOTE — PROGRESS NOTES
Neurology follow-up visit     Referred By: Dr. Sands ref. provider found    Chief Complaint: Patient presents with:  Neurologic Problem: LOV 8/11/20. Here to discuss test results. Unaware of order for carotid ultrasound.        HPI:     Delphine Roberts is a Packs/day   Types: Cigarettes   Quit date: 1/1/2011   Smokeless tobacco: Never Used       Alcohol use Yes 0.0 standard drinks/week   Comment: Wine, socially       Drug use: No       Family History   Problem Relation Age of Onset   • Cancer Maternal Grandmo midline    Motor Exam:  Muscle tone normal  No atrophy or fasciculations  Strength- upper extremities 5/5 proximally and distally  Rapid alternating movements intact    Gait:  Normal posture  Normal physiologic      Labs:    Lab Results   Component Value D

## 2020-09-10 ENCOUNTER — TELEPHONE (OUTPATIENT)
Dept: NEUROLOGY | Facility: CLINIC | Age: 60
End: 2020-09-10

## 2020-09-10 ENCOUNTER — MED REC SCAN ONLY (OUTPATIENT)
Dept: NEUROLOGY | Facility: CLINIC | Age: 60
End: 2020-09-10

## 2020-09-10 NOTE — PROGRESS NOTES
Please let the patient know that cardiac monitor didn't show signs of the a. Fib that could have contributed to the TIA/stroke symptoms.

## 2020-09-10 NOTE — TELEPHONE ENCOUNTER
----- Message from Valentina Scruggs MD sent at 9/10/2020 12:48 PM CDT -----  Please let the patient know that cardiac monitor didn't show signs of the a. Fib that could have contributed to the TIA/stroke symptoms.

## 2020-09-18 ENCOUNTER — TELEPHONE (OUTPATIENT)
Dept: FAMILY MEDICINE CLINIC | Facility: CLINIC | Age: 60
End: 2020-09-18

## 2020-09-18 NOTE — TELEPHONE ENCOUNTER
Pt asking if Dr. 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  Pt also needs to be tested before she can see her therapist. Pt states she does have a runny nose

## 2020-09-21 ENCOUNTER — OFFICE VISIT (OUTPATIENT)
Dept: SLEEP CENTER | Age: 60
End: 2020-09-21
Attending: Other
Payer: COMMERCIAL

## 2020-09-21 DIAGNOSIS — G47.00 INSOMNIA, UNSPECIFIED TYPE: ICD-10-CM

## 2020-09-21 DIAGNOSIS — G47.33 OSA (OBSTRUCTIVE SLEEP APNEA): Primary | ICD-10-CM

## 2020-09-21 PROCEDURE — 95806 SLEEP STUDY UNATT&RESP EFFT: CPT

## 2020-09-22 ENCOUNTER — TELEPHONE (OUTPATIENT)
Dept: NEUROLOGY | Facility: CLINIC | Age: 60
End: 2020-09-22

## 2020-09-22 ENCOUNTER — HOSPITAL ENCOUNTER (OUTPATIENT)
Dept: ULTRASOUND IMAGING | Facility: HOSPITAL | Age: 60
Discharge: HOME | End: 2020-09-22
Attending: Other
Payer: COMMERCIAL

## 2020-09-22 DIAGNOSIS — I63.9 CEREBROVASCULAR ACCIDENT (CVA), UNSPECIFIED MECHANISM (HCC): ICD-10-CM

## 2020-09-22 PROCEDURE — 93880 EXTRACRANIAL BILAT STUDY: CPT | Performed by: OTHER

## 2020-09-22 NOTE — TELEPHONE ENCOUNTER
Informed patient of ultrasound results. Patient verbalized understanding. Patient asking if Dr Gonzalez Leslie has any explanation for why she had the stroke if everything is coming back normal. Please advise.

## 2020-09-22 NOTE — TELEPHONE ENCOUNTER
----- Message from Anand Potrillo MD sent at 9/22/2020 12:30 PM CDT -----  Please let the patient know that doppler of carotids didn't show significant stenosis (narrowing).     Thank you

## 2020-09-22 NOTE — TELEPHONE ENCOUNTER
It is not possible to say exactly what causes strokes in each particular person. But we do know the typical risk factors for strokes include high cholesterol, high blood pressure, diabetes, history of smoking.

## 2020-09-22 NOTE — PROGRESS NOTES
Please let the patient know that doppler of carotids didn't show significant stenosis (narrowing).     Thank you

## 2020-09-24 ENCOUNTER — TELEPHONE (OUTPATIENT)
Dept: NEUROLOGY | Facility: CLINIC | Age: 60
End: 2020-09-24

## 2020-09-24 NOTE — TELEPHONE ENCOUNTER
Spoke to patient and notified her of below. She was understanding. Provided phone number for Dr. Kellen Crowley. She will call and schedule an appointment.

## 2020-09-24 NOTE — TELEPHONE ENCOUNTER
----- Message from Anand Portillo MD sent at 9/24/2020  7:38 AM CDT -----  Please let the patient know that sleep study showed sleep apnea and she should follow-up with Dr. Sharon Pimentel for further management    Thank you

## 2020-09-24 NOTE — PROCEDURES
[facility]  Accredited by the [facility] ([facility])    PATIENT'S NAME: Gabriella Echevarria   ATTENDING PHYSICIAN: Terry Oliver MD   REFERRING PHYSICIAN: Terry Oliver MD   PATIENT ACCOUNT #: [id number] LOC apnea which becomes severe in the supine position (ICD-10 code G47.33). RECOMMENDATIONS:    1. Consider CPAP titration. 2.   Avoid alcohol. 3.   Avoid sedating drug. 4.   Patient should not drive if at all sleepy.     Please do not hesitate to conta

## 2020-11-18 ENCOUNTER — VIRTUAL PHONE E/M (OUTPATIENT)
Dept: FAMILY MEDICINE CLINIC | Facility: CLINIC | Age: 60
End: 2020-11-18
Payer: COMMERCIAL

## 2020-11-18 VITALS — TEMPERATURE: 99 F | BODY MASS INDEX: 24 KG/M2 | HEIGHT: 62.5 IN

## 2020-11-18 DIAGNOSIS — R43.0 LOSS OF SMELL: ICD-10-CM

## 2020-11-18 DIAGNOSIS — R09.82 POST-NASAL DRIP: Primary | ICD-10-CM

## 2020-11-18 DIAGNOSIS — R43.2 LOSS OF TASTE: ICD-10-CM

## 2020-11-18 PROBLEM — Z00.00 ADULT GENERAL MEDICAL EXAM: Status: RESOLVED | Noted: 2019-08-13 | Resolved: 2020-11-18

## 2020-11-18 PROBLEM — R51.9 PRESSURE IN HEAD: Status: RESOLVED | Noted: 2020-07-21 | Resolved: 2020-11-18

## 2020-11-18 PROCEDURE — 99213 OFFICE O/P EST LOW 20 MIN: CPT | Performed by: FAMILY MEDICINE

## 2020-11-18 NOTE — PROGRESS NOTES
Corey Webb  or legal guardian ,verbally consents to a Virtual/Telephone Check-In visit on 05/26/20. Patient has been referred to the [facility] website at [email or web address] to review the yearly Consent to Treat document.     Patient understands and External Cream, Apply 1 Application topically 2 (two) times daily as needed. , Disp: 1 Tube, Rfl: 0  •  anastrozole 1 MG Oral Tab tab, Take 1 mg by mouth daily.   , Disp: , Rfl: 0    ALLERGY LIST  No Known Allergies    Allergies:No Known Allergies    PHYSICA

## 2020-11-19 ENCOUNTER — TELEPHONE (OUTPATIENT)
Dept: FAMILY MEDICINE CLINIC | Facility: CLINIC | Age: 60
End: 2020-11-19

## 2020-11-19 NOTE — TELEPHONE ENCOUNTER
Pt states she had a Covid-19 test scheduled today, however, she had a rapid test done today at an  facility, test came back positive. Pt states she had testing done today as she was having symptoms.  Advised pt to quarantine for 14 days, isolate from spou

## 2020-11-19 NOTE — TELEPHONE ENCOUNTER
Advised pt of doctors note below. Pt verbalized understanding and no further questions or concerns.  Lab appointment cancelled per pt's request.

## 2020-11-19 NOTE — TELEPHONE ENCOUNTER
Due to the pt having s/s, it is likely that the rapid covid test is correct. I would recommend that appt for covid testing be canceled for today and self isolation precautions be started.

## 2020-11-25 DIAGNOSIS — F51.04 PSYCHOPHYSIOLOGICAL INSOMNIA: ICD-10-CM

## 2020-11-25 DIAGNOSIS — F41.9 ANXIETY: ICD-10-CM

## 2020-11-25 RX ORDER — ALPRAZOLAM 0.25 MG/1
TABLET ORAL
Qty: 30 TABLET | Refills: 1 | Status: SHIPPED | OUTPATIENT
Start: 2020-11-25 | End: 2021-06-18

## 2020-11-25 NOTE — TELEPHONE ENCOUNTER
•  ALPRAZolam 0.25 MG Oral Tab, TAKE 1 TABLET BY MOUTH EVERY NIGHT AS NEEDED FOR SLEEP OR ANXIETY, Disp: 30 tablet, Rfl: 1

## 2020-12-08 ENCOUNTER — NURSE TRIAGE (OUTPATIENT)
Dept: FAMILY MEDICINE CLINIC | Facility: CLINIC | Age: 60
End: 2020-12-08

## 2020-12-08 NOTE — TELEPHONE ENCOUNTER
Alex Pearl for Dr. Pito Arboleda Pt stated that for about three weeks. She has been having some pain on her right arm from her shoulder down to her elbow. The pain can range from 5-8/10. Today she started to feel some tingling on her fingers. Pt denied any redness,swel

## 2020-12-09 ENCOUNTER — OFFICE VISIT (OUTPATIENT)
Dept: FAMILY MEDICINE CLINIC | Facility: CLINIC | Age: 60
End: 2020-12-09
Payer: COMMERCIAL

## 2020-12-09 VITALS
HEART RATE: 61 BPM | WEIGHT: 140 LBS | SYSTOLIC BLOOD PRESSURE: 144 MMHG | DIASTOLIC BLOOD PRESSURE: 80 MMHG | HEIGHT: 62.5 IN | BODY MASS INDEX: 25.12 KG/M2

## 2020-12-09 DIAGNOSIS — M79.601 RIGHT ARM PAIN: ICD-10-CM

## 2020-12-09 DIAGNOSIS — M54.2 NECK PAIN: Primary | ICD-10-CM

## 2020-12-09 PROCEDURE — 3077F SYST BP >= 140 MM HG: CPT | Performed by: NURSE PRACTITIONER

## 2020-12-09 PROCEDURE — 99214 OFFICE O/P EST MOD 30 MIN: CPT | Performed by: NURSE PRACTITIONER

## 2020-12-09 PROCEDURE — 3008F BODY MASS INDEX DOCD: CPT | Performed by: NURSE PRACTITIONER

## 2020-12-09 PROCEDURE — 3079F DIAST BP 80-89 MM HG: CPT | Performed by: NURSE PRACTITIONER

## 2020-12-09 RX ORDER — NAPROXEN 500 MG/1
500 TABLET ORAL 2 TIMES DAILY WITH MEALS
Qty: 60 TABLET | Refills: 1 | Status: SHIPPED | OUTPATIENT
Start: 2020-12-09 | End: 2021-11-30

## 2020-12-09 NOTE — TELEPHONE ENCOUNTER
Office Visit    12/9/2020  [facility], [facility], [address], Antoni Macais Gist, APRN  Nurse Practitioner  Neck pain +1 more  Dx  Arm Pain   , Numbness    Reason for Visit

## 2020-12-09 NOTE — PATIENT INSTRUCTIONS
Neck Pain     Neck pain has several possible causes when there is no injury:  · You can get a minor ligament sprain or muscle strain from a sudden minor neck movement. Sleeping with your neck in an awkward position can also cause this.   · Some people res microwave and massage. Others prefer cold packs.  You can make an ice pack by filling a plastic bag that seals at the top with ice cubes or crushed ice and then wrapping it with a thin towel. Try both and use the method that feels best for 15 to 20 minutes, Relieving Your Symptoms  The first goal of treatment is to relieve your symptoms. Your healthcare provider may recommend self-care treatments. These include resting, applying ice and heat, taking medicine, and doing exercises.  Your healthcare provider may properly. Treatment methods used in physical therapy may include:   · Heat. A special heating pad called a neck pack may be applied to your neck. · Exercises. Your PT will teach you exercises to help strengthen your neck and improve its range of motion.

## 2020-12-09 NOTE — ASSESSMENT & PLAN NOTE
Naproxen 500 mg I po bid w food prn  Ice 20 min 4-6 times per day  Refer physiatry   Please call if symptoms worsen or are not resolving.

## 2021-03-03 DIAGNOSIS — E78.2 MIXED HYPERLIPIDEMIA: ICD-10-CM

## 2021-03-03 NOTE — TELEPHONE ENCOUNTER
•  atorvastatin 20 MG Oral Tab, TAKE 1 TABLET BY MOUTH EVERY NIGHT FOR CHOLESTEROL, Disp: 90 tablet, Rfl: 1

## 2021-03-04 RX ORDER — ATORVASTATIN CALCIUM 20 MG/1
TABLET, FILM COATED ORAL
Qty: 90 TABLET | Refills: 1 | Status: SHIPPED | OUTPATIENT
Start: 2021-03-04 | End: 2021-11-09

## 2021-03-30 ENCOUNTER — TELEPHONE (OUTPATIENT)
Dept: FAMILY MEDICINE CLINIC | Facility: CLINIC | Age: 61
End: 2021-03-30

## 2021-03-30 NOTE — TELEPHONE ENCOUNTER
Patient contacts clinic reporting ongoing sinus congestion and nasal drainage since COVID infection in November. Denies chest congestion, cough or difficulty breathing. Denies fever, body aches or chills. Denies abdominal symptoms.   No recent known sick

## 2021-04-01 NOTE — TELEPHONE ENCOUNTER
Can she come in at 1 PM today so I can evaluate her? Considering that she has had this since November I doubt this is COVID-19.

## 2021-04-01 NOTE — TELEPHONE ENCOUNTER
Called the patient and she feels like this nasal congestion. \"Sometimes it's so bad that I'm so stuffy that I can't breathe so I am using my flonase but I really don't want to keep using that. I would like to rule out that this isn't allergies.     Schedul

## 2021-04-05 ENCOUNTER — LAB ENCOUNTER (OUTPATIENT)
Dept: LAB | Age: 61
End: 2021-04-05
Attending: FAMILY MEDICINE
Payer: COMMERCIAL

## 2021-04-05 ENCOUNTER — OFFICE VISIT (OUTPATIENT)
Dept: FAMILY MEDICINE CLINIC | Facility: CLINIC | Age: 61
End: 2021-04-05
Payer: COMMERCIAL

## 2021-04-05 ENCOUNTER — HOSPITAL ENCOUNTER (OUTPATIENT)
Dept: GENERAL RADIOLOGY | Age: 61
Discharge: HOME | End: 2021-04-05
Attending: FAMILY MEDICINE
Payer: COMMERCIAL

## 2021-04-05 VITALS
SYSTOLIC BLOOD PRESSURE: 125 MMHG | DIASTOLIC BLOOD PRESSURE: 75 MMHG | HEIGHT: 62.5 IN | WEIGHT: 139 LBS | HEART RATE: 86 BPM | BODY MASS INDEX: 24.94 KG/M2 | TEMPERATURE: 98 F

## 2021-04-05 DIAGNOSIS — G89.29 CHRONIC RIGHT SHOULDER PAIN: ICD-10-CM

## 2021-04-05 DIAGNOSIS — R09.81 NASAL CONGESTION: ICD-10-CM

## 2021-04-05 DIAGNOSIS — M25.511 CHRONIC RIGHT SHOULDER PAIN: ICD-10-CM

## 2021-04-05 DIAGNOSIS — J30.89 OTHER ALLERGIC RHINITIS: Primary | ICD-10-CM

## 2021-04-05 DIAGNOSIS — J30.89 OTHER ALLERGIC RHINITIS: ICD-10-CM

## 2021-04-05 DIAGNOSIS — M75.41 ROTATOR CUFF IMPINGEMENT SYNDROME OF RIGHT SHOULDER: ICD-10-CM

## 2021-04-05 PROCEDURE — 3074F SYST BP LT 130 MM HG: CPT | Performed by: FAMILY MEDICINE

## 2021-04-05 PROCEDURE — 82785 ASSAY OF IGE: CPT

## 2021-04-05 PROCEDURE — 86003 ALLG SPEC IGE CRUDE XTRC EA: CPT

## 2021-04-05 PROCEDURE — 36415 COLL VENOUS BLD VENIPUNCTURE: CPT

## 2021-04-05 PROCEDURE — 3008F BODY MASS INDEX DOCD: CPT | Performed by: FAMILY MEDICINE

## 2021-04-05 PROCEDURE — 3078F DIAST BP <80 MM HG: CPT | Performed by: FAMILY MEDICINE

## 2021-04-05 PROCEDURE — 99214 OFFICE O/P EST MOD 30 MIN: CPT | Performed by: FAMILY MEDICINE

## 2021-04-05 PROCEDURE — 73030 X-RAY EXAM OF SHOULDER: CPT | Performed by: FAMILY MEDICINE

## 2021-04-05 RX ORDER — DICLOFENAC SODIUM 75 MG/1
75 TABLET, DELAYED RELEASE ORAL 2 TIMES DAILY
Qty: 42 TABLET | Refills: 0 | COMMUNITY
Start: 2021-04-05 | End: 2021-04-21

## 2021-04-05 RX ORDER — MONTELUKAST SODIUM 10 MG/1
10 TABLET ORAL NIGHTLY
Qty: 90 TABLET | Refills: 1 | Status: SHIPPED | OUTPATIENT
Start: 2021-04-05 | End: 2021-10-02

## 2021-04-05 RX ORDER — ANASTROZOLE 1 MG/1
1 TABLET ORAL DAILY
COMMUNITY
Start: 2021-02-10

## 2021-04-05 RX ORDER — AZELASTINE 1 MG/ML
2 SPRAY, METERED NASAL 2 TIMES DAILY
Qty: 1 BOTTLE | Refills: 0 | Status: SHIPPED | OUTPATIENT
Start: 2021-04-05 | End: 2021-11-30

## 2021-04-05 NOTE — PROGRESS NOTES
Patient ID: Corey Webb is a 61year old female.     HPI  Patient presents with:  Nasal Congestion: followed w/headaches  Arm Pain: R arm [id number] injueries  ========================================================================  Emmy Collado 139 lb  12/09/20 : 140 lb  08/11/20 : 135 lb  07/21/20 : 137 lb 6.4 oz  07/11/20 : 137 lb 9.6 oz  01/21/20 : 139 lb      BMI Readings from Last 6 Encounters:  04/05/21 : 25.02 kg/m²  12/09/20 : 25.20 kg/m²  11/18/20 : 24.30 kg/m²  08/11/20 : 24.30 kg/m²  0 Take 1 tablet (81 mg total) by mouth daily.  90 tablet 3     Allergies:No Known Allergies     Physical Exam:       Physical Exam  Blood pressure 125/75, pulse 86, temperature 98.3 °F (36.8 °C), height 5' 2.5\" (1.588 m), weight 139 lb, not currently breastf sprays and do not snort it back into throat. -     Montelukast Sodium (SINGULAIR) 10 MG Oral Tab; Take 1 tablet (10 mg total) by mouth nightly. -     [facility] PROF;  Future  Try and stop using the Afrin as you are going to get worse and m patient/family member understands and agrees to plan. No follow-ups on file. Patient Instructions   Look up exercises for rotator cuff impingement syndrome.       Tejal Hudson    4/5/2021    By signing my name below, dmitri Cobos

## 2021-04-21 DIAGNOSIS — M75.41 ROTATOR CUFF IMPINGEMENT SYNDROME OF RIGHT SHOULDER: ICD-10-CM

## 2021-04-21 DIAGNOSIS — M25.511 CHRONIC RIGHT SHOULDER PAIN: ICD-10-CM

## 2021-04-21 DIAGNOSIS — G89.29 CHRONIC RIGHT SHOULDER PAIN: ICD-10-CM

## 2021-04-22 RX ORDER — DICLOFENAC SODIUM 75 MG/1
75 TABLET, DELAYED RELEASE ORAL 2 TIMES DAILY
Qty: 42 TABLET | Refills: 0 | Status: SHIPPED | OUTPATIENT
Start: 2021-04-22 | End: 2021-06-18

## 2021-04-27 ENCOUNTER — OFFICE VISIT (OUTPATIENT)
Dept: OTOLARYNGOLOGY | Facility: CLINIC | Age: 61
End: 2021-04-27
Payer: COMMERCIAL

## 2021-04-27 VITALS
SYSTOLIC BLOOD PRESSURE: 150 MMHG | DIASTOLIC BLOOD PRESSURE: 76 MMHG | BODY MASS INDEX: 24.94 KG/M2 | WEIGHT: 139 LBS | HEIGHT: 62.5 IN | TEMPERATURE: 99 F

## 2021-04-27 DIAGNOSIS — J31.0 RHINITIS MEDICAMENTOSA: Primary | ICD-10-CM

## 2021-04-27 DIAGNOSIS — J34.2 DEVIATED SEPTUM: ICD-10-CM

## 2021-04-27 DIAGNOSIS — T48.5X5A RHINITIS MEDICAMENTOSA: Primary | ICD-10-CM

## 2021-04-27 PROCEDURE — 3008F BODY MASS INDEX DOCD: CPT | Performed by: OTOLARYNGOLOGY

## 2021-04-27 PROCEDURE — 3077F SYST BP >= 140 MM HG: CPT | Performed by: OTOLARYNGOLOGY

## 2021-04-27 PROCEDURE — 3078F DIAST BP <80 MM HG: CPT | Performed by: OTOLARYNGOLOGY

## 2021-04-27 PROCEDURE — 99243 OFF/OP CNSLTJ NEW/EST LOW 30: CPT | Performed by: OTOLARYNGOLOGY

## 2021-04-27 RX ORDER — TRIAMCINOLONE ACETONIDE 55 UG/1
1 SPRAY, METERED NASAL 2 TIMES DAILY
Qty: 1 INHALER | Refills: 3 | Status: SHIPPED | OUTPATIENT
Start: 2021-04-27 | End: 2021-11-30

## 2021-04-27 NOTE — PROGRESS NOTES
Rufus Garvey is a 61year old female. Patient presents with:  Nose Problem: c/o nasal congestion since November    HPI:   In November she developed Covid. She had a lot of congestion in her nose because of it.   She was using OTC nasal spray which help 8/2021   • Essential hypertension    • History of colposcopy    • Hyperlipidemia    • Lipid screening 05-    Per [employer]   • Multiple allergies     Per [employer]:  \"Allergies. \"   • Osteoporosis screening 02-    Per [employer]      Social History Inspection - Right: Normal, Left: Normal. Canal - Left: Normal. TM - Right: Normal, Left: Normal.     ASSESSMENT AND PLAN:   1. Rhinitis medicamentosa  She has nasal congestion secondary to a number of factors.   She does have a deviated septum and signs of

## 2021-06-18 ENCOUNTER — OFFICE VISIT (OUTPATIENT)
Dept: FAMILY MEDICINE CLINIC | Facility: CLINIC | Age: 61
End: 2021-06-18
Payer: COMMERCIAL

## 2021-06-18 VITALS
DIASTOLIC BLOOD PRESSURE: 69 MMHG | HEART RATE: 67 BPM | SYSTOLIC BLOOD PRESSURE: 125 MMHG | BODY MASS INDEX: 24.73 KG/M2 | HEIGHT: 62.5 IN | WEIGHT: 137.81 LBS | TEMPERATURE: 98 F

## 2021-06-18 DIAGNOSIS — G89.29 CHRONIC RIGHT SHOULDER PAIN: ICD-10-CM

## 2021-06-18 DIAGNOSIS — R42 DIZZINESS: Primary | ICD-10-CM

## 2021-06-18 DIAGNOSIS — I63.9 CEREBROVASCULAR ACCIDENT (CVA), UNSPECIFIED MECHANISM (HCC): ICD-10-CM

## 2021-06-18 DIAGNOSIS — M25.511 CHRONIC RIGHT SHOULDER PAIN: ICD-10-CM

## 2021-06-18 DIAGNOSIS — F41.9 ANXIETY: ICD-10-CM

## 2021-06-18 DIAGNOSIS — F51.04 PSYCHOPHYSIOLOGICAL INSOMNIA: ICD-10-CM

## 2021-06-18 DIAGNOSIS — R22.0 JAW SWELLING: ICD-10-CM

## 2021-06-18 DIAGNOSIS — M75.41 ROTATOR CUFF IMPINGEMENT SYNDROME OF RIGHT SHOULDER: ICD-10-CM

## 2021-06-18 PROCEDURE — 3078F DIAST BP <80 MM HG: CPT | Performed by: FAMILY MEDICINE

## 2021-06-18 PROCEDURE — 99215 OFFICE O/P EST HI 40 MIN: CPT | Performed by: FAMILY MEDICINE

## 2021-06-18 PROCEDURE — 3074F SYST BP LT 130 MM HG: CPT | Performed by: FAMILY MEDICINE

## 2021-06-18 PROCEDURE — 3008F BODY MASS INDEX DOCD: CPT | Performed by: FAMILY MEDICINE

## 2021-06-18 RX ORDER — CLONAZEPAM 0.5 MG/1
0.5 TABLET ORAL NIGHTLY PRN
Qty: 30 TABLET | Refills: 0 | Status: SHIPPED | OUTPATIENT
Start: 2021-06-18 | End: 2021-11-30

## 2021-06-18 RX ORDER — DICLOFENAC SODIUM 75 MG/1
75 TABLET, DELAYED RELEASE ORAL 2 TIMES DAILY
Qty: 60 TABLET | Refills: 0 | Status: SHIPPED | OUTPATIENT
Start: 2021-06-18 | End: 2021-11-30

## 2021-06-18 RX ORDER — ALPRAZOLAM 0.25 MG/1
TABLET ORAL
Qty: 30 TABLET | Refills: 1 | Status: CANCELLED | OUTPATIENT
Start: 2021-06-18

## 2021-06-18 RX ORDER — ALPRAZOLAM 0.25 MG/1
TABLET ORAL
Qty: 20 TABLET | Refills: 0 | Status: SHIPPED | OUTPATIENT
Start: 2021-06-18 | End: 2021-11-09

## 2021-06-18 RX ORDER — CEPHALEXIN 500 MG/1
500 CAPSULE ORAL 3 TIMES DAILY
Qty: 21 CAPSULE | Refills: 0 | Status: SHIPPED | OUTPATIENT
Start: 2021-06-18 | End: 2021-06-25

## 2021-06-18 NOTE — PROGRESS NOTES
Patient ID: Delphine Roberts is a 64year old female. HPI  Patient presents with:  Mass: jaw line , left side  Dizziness: x 10 days    Last seen by me on 4/5/2021. Pt c/o dizziness when laying on the floor with her grandchildren and while at PT.  She shortness of breath. Cardiovascular: Negative for chest pain. Musculoskeletal: Positive for arthralgias. Neurological: Positive for dizziness and light-headedness. Negative for speech difficulty and weakness.    Psychiatric/Behavioral: Positive for s temperature 98.2 °F (36.8 °C), temperature source Temporal, height 5' 2.5\" (1.588 m), weight 137 lb 12.8 oz, not currently breastfeeding. Physical Exam   Constitutional: Patient is oriented to person, place, and time.  Patient appears well-developed and Tab; Take 1 tablet (0.5 mg total) by mouth nightly as needed (sleep). -     ALPRAZolam 0.25 MG Oral Tab; TAKE 1 TABLET BY MOUTH during the day as needed for stress or anxiety.     Psychophysiological insomnia  -     clonazePAM (KLONOPIN) 0.5 MG Oral Tab; T to treatment discussed and patient/family member understands and agrees to plan. No follow-ups on file. There are no Patient Instructions on file for this visit.     Rolanda Watts    6/18/2021    By signing my name below, IRolanda  a

## 2021-07-27 ENCOUNTER — TELEPHONE (OUTPATIENT)
Dept: GASTROENTEROLOGY | Facility: CLINIC | Age: 61
End: 2021-07-27

## 2021-07-27 NOTE — TELEPHONE ENCOUNTER
----- Message from Guru Davies CMA sent at 9/4/2018  1:16 PM CDT -----  Regarding: 3 yr CLN recall  Recall colon in 3 years per.  Colon done 8/27/18

## 2021-08-03 ENCOUNTER — TELEPHONE (OUTPATIENT)
Dept: GASTROENTEROLOGY | Facility: CLINIC | Age: 61
End: 2021-08-03

## 2021-08-03 NOTE — TELEPHONE ENCOUNTER
Last Procedure, Date, MD: Colonoscopy, 8/27/18, Dr. Canelo Mcfarland   Last Diagnosis:     Recalled for (mth/yrs): 5 years  Sedation used previously: MAC  Last Prep Used (if known): n/a  Quality of prep (if known): good  Anticoagulants: n/a  Diabetic Meds: n/a  BP me

## 2021-08-03 NOTE — TELEPHONE ENCOUNTER
-    -   -   Final Diagnosis:      A. Cecum polyp; biopsy:  · Fragments of sessile serrated adenoma     B.  Sigmoid colon polyp; biopsy:  · Hyperplastic polyp

## 2021-08-03 NOTE — TELEPHONE ENCOUNTER
Pt calling to schedule CLN. Pt received 3 year recall letter. Pt denies any GI symptoms at this time. Pt prefers to have CLN on a Monday or Tuesday morning.  Please call [phone]

## 2021-08-04 NOTE — TELEPHONE ENCOUNTER
Contacted patient who thought our office contacted her. I let her know we did not and I am still waiting for response on possibility of phone visit. Patient verbalized understanding.

## 2021-08-04 NOTE — TELEPHONE ENCOUNTER
John Barger spoke to patient and attempted to schedule office visit for 9/14 Tuesday at 4:30p. Patient states she will be out of town, I offered next available in October. Patient asked if able to do phone visit on 9/14, if possible.      Please advi

## 2021-08-04 NOTE — TELEPHONE ENCOUNTER
Left message for patient to call back. If patient returns call please have patient confirm if she can do phone visit on 9/23 at 3pm [facility]) with Collette Herder. Needs to be in state for phone visit. Patient already placed in appt to hold spot.

## 2021-08-04 NOTE — TELEPHONE ENCOUNTER
Nursing:  Will she be in state? I believe has to be in state for telehealth visit. We can plan for in office visit when she returns from her trip.    Thanks,  Roselia

## 2021-08-04 NOTE — TELEPHONE ENCOUNTER
Marjorie Credit,    Patient states she does not want to wait until October for OV, if possible. Please advise if patient can be scheduled for telephone visit in an available [facility] appt spot. Thank you!

## 2021-08-05 NOTE — TELEPHONE ENCOUNTER
Contacted patient and confirmed appt on 9/14/21. Patient states she will come into office for visit. Location information given to patient.     Your appointments     Date & Time Appointment Department [facility])    Sep 14, 2021  4:30 PM CDT Consult with Agustin Sauceda

## 2021-09-01 ENCOUNTER — TELEPHONE (OUTPATIENT)
Dept: NEUROLOGY | Facility: CLINIC | Age: 61
End: 2021-09-01

## 2021-09-01 NOTE — TELEPHONE ENCOUNTER
Patient  having a biopsy on 9/15 and has to know if she can be off her daily aspirin for   5-7 days prior to procedure.

## 2021-09-07 NOTE — TELEPHONE ENCOUNTER
Spoke to pt to notify of Dr Sharon Sue advisement against stopping ASA for biopsy. Advised pt to discuss with MD doing biopsy.  Pt VU

## 2021-09-07 NOTE — TELEPHONE ENCOUNTER
I would advise against stopping aspirin for the biopsy. Clearance for procedure after stroke    Timing is important when it comes to surgery after a stroke.        Some stroke patients undergoing vitreoretinal surgery, EMG, transbronchial lung biopsy, co

## 2021-09-07 NOTE — H&P
[facility], [facility] - Gastroenterology                                                                                                               Reason for consult: crc screening    Requesting physician or provider: Valentino Bali, DO    Patient presents wi \"Allergies. \"   • Osteoporosis screening 02-    Per [employer]      Past Surgical History:   Procedure Laterality Date   • OTHER SURGICAL HISTORY Left 11/2016    lumpectomy    • OTHER SURGICAL HISTORY Right 11/2014    breast cyst   • OTHER SURGICAL HI mouth nightly. (Patient not taking: Reported on 9/14/2021) 90 tablet 1   • naproxen 500 MG Oral Tab Take 1 tablet (500 mg total) by mouth 2 (two) times daily with meals.  (Patient not taking: Reported on 9/14/2021) 60 tablet 1       Allergies:  No Known All uL 4.68    Lymphocyte Absolute   1.00 - 4.00 x10(3) uL 1.54    Monocyte Absolute   0.10 - 1.00 x10(3) uL 0.55    Eosinophil Absolute   0.00 - 0.70 x10(3) uL 0.09    Basophil Absolute   0.00 - 0.20 x10(3) uL 0.04    Immature Granulocyte Absolute   0.00 - 1. COVID within 72 hours of your procedure.   You will be contacted with instructions on how to do this.       >>>Please note: if you were prescribed Suprep for the bowel prep and it is too expensive or not covered by insurance, it is okay to substitute [employer]

## 2021-09-14 ENCOUNTER — OFFICE VISIT (OUTPATIENT)
Dept: GASTROENTEROLOGY | Facility: CLINIC | Age: 61
End: 2021-09-14
Payer: COMMERCIAL

## 2021-09-14 VITALS
DIASTOLIC BLOOD PRESSURE: 58 MMHG | SYSTOLIC BLOOD PRESSURE: 112 MMHG | HEART RATE: 74 BPM | WEIGHT: 142.19 LBS | BODY MASS INDEX: 25.51 KG/M2 | TEMPERATURE: 99 F | HEIGHT: 62.5 IN

## 2021-09-14 DIAGNOSIS — R14.0 BLOATING: ICD-10-CM

## 2021-09-14 DIAGNOSIS — Z86.010 PERSONAL HISTORY OF COLONIC POLYPS: ICD-10-CM

## 2021-09-14 DIAGNOSIS — R12 HEARTBURN: ICD-10-CM

## 2021-09-14 DIAGNOSIS — Z12.11 COLON CANCER SCREENING: Primary | ICD-10-CM

## 2021-09-14 PROCEDURE — 3074F SYST BP LT 130 MM HG: CPT | Performed by: NURSE PRACTITIONER

## 2021-09-14 PROCEDURE — 3008F BODY MASS INDEX DOCD: CPT | Performed by: NURSE PRACTITIONER

## 2021-09-14 PROCEDURE — 99214 OFFICE O/P EST MOD 30 MIN: CPT | Performed by: NURSE PRACTITIONER

## 2021-09-14 PROCEDURE — 3078F DIAST BP <80 MM HG: CPT | Performed by: NURSE PRACTITIONER

## 2021-11-08 DIAGNOSIS — E78.2 MIXED HYPERLIPIDEMIA: ICD-10-CM

## 2021-11-08 DIAGNOSIS — F41.9 ANXIETY: ICD-10-CM

## 2021-11-09 RX ORDER — ATORVASTATIN CALCIUM 20 MG/1
TABLET, FILM COATED ORAL
Qty: 90 TABLET | Refills: 0 | Status: SHIPPED | OUTPATIENT
Start: 2021-11-09 | End: 2022-03-10

## 2021-11-09 RX ORDER — ALPRAZOLAM 0.25 MG/1
TABLET ORAL
Qty: 30 TABLET | Refills: 0 | Status: SHIPPED | OUTPATIENT
Start: 2021-11-09 | End: 2021-11-30

## 2021-11-09 NOTE — TELEPHONE ENCOUNTER
Please review. Protocol failed / No protocol.    Requested Prescriptions   Pending Prescriptions Disp Refills    ATORVASTATIN 20 MG Oral Tab [Pharmacy Med Name: ATORVASTATIN 20MG TABLETS] 90 tablet 1     Sig: TAKE 1 TABLET BY MOUTH EVERY NIGHT FOR CHOLESTEROL        Cholesterol Medication Protocol Failed - 11/8/2021  8:14 PM        Failed - ALT in past 12 months        Failed - LDL in past 12 months        Failed - Last ALT < 80       Lab Results   Component Value Date    ALT 57 (H) 08/13/2019             Failed - Last LDL < 130     Lab Results   Component Value Date     (H) 08/13/2019               Passed - Appointment in past 12 or next 3 months                Recent Outpatient Visits              1 month ago Colon cancer screening    [address] Z[address]sey, [address] [facility], [facility] [place]er, APRN    Office Visit    4 months ago [address] [place] [address] [address], [address], [place], DO    Office Visit    6 months ago Rhinitis medicamentosa    [facility] for [address] [place] [address], [place] [address] Aniya Duncan MD    Office Visit    7 months ago Other allergic rhinitis    [address], [address], [place], DO    Office Visit    11 months ago Neck pain    [address], [address], Herber Boehringer, APRN    Office Visit

## 2021-11-09 NOTE — TELEPHONE ENCOUNTER
Please review. Protocol Failed or has No Protocol.     Requested Prescriptions   Pending Prescriptions Disp Refills    ALPRAZOLAM 0.25 MG Oral Tab [Pharmacy Med Name: ALPRAZOLAM 0.25MG TABLETS] 30 tablet 0     Sig: TAKE 1 TABLET BY MOUTH EVERY NIGHT AS NEEDED FOR SLEEP OR ANXIETY        There is no refill protocol information for this order           Recent Outpatient Visits              1 month ago Colon cancer screening    [facility], APRN    Office Visit    4 months ago [address], [address], [address], [place], DO    Office Visit    6 months ago Rhinitis medicamentosa    [address], [place] [address] Tova Ott MD    Office Visit    7 months ago Other allergic rhinitis    [facility], [facility], [address], [address], [place], DO    Office Visit    11 months ago Neck pain    [facility], [address], Kieran Bella, APRN    Office Visit

## 2021-11-30 ENCOUNTER — LAB ENCOUNTER (OUTPATIENT)
Dept: LAB | Age: 61
End: 2021-11-30
Attending: FAMILY MEDICINE
Payer: COMMERCIAL

## 2021-11-30 ENCOUNTER — OFFICE VISIT (OUTPATIENT)
Dept: FAMILY MEDICINE CLINIC | Facility: CLINIC | Age: 61
End: 2021-11-30
Payer: COMMERCIAL

## 2021-11-30 VITALS
HEART RATE: 74 BPM | DIASTOLIC BLOOD PRESSURE: 75 MMHG | WEIGHT: 142.81 LBS | SYSTOLIC BLOOD PRESSURE: 128 MMHG | HEIGHT: 62.5 IN | BODY MASS INDEX: 25.62 KG/M2 | TEMPERATURE: 98 F

## 2021-11-30 DIAGNOSIS — Z00.00 ADULT GENERAL MEDICAL EXAM: ICD-10-CM

## 2021-11-30 DIAGNOSIS — Z00.00 ADULT GENERAL MEDICAL EXAM: Primary | ICD-10-CM

## 2021-11-30 DIAGNOSIS — F41.9 ANXIETY: ICD-10-CM

## 2021-11-30 PROCEDURE — 36415 COLL VENOUS BLD VENIPUNCTURE: CPT

## 2021-11-30 PROCEDURE — 84443 ASSAY THYROID STIM HORMONE: CPT

## 2021-11-30 PROCEDURE — 3078F DIAST BP <80 MM HG: CPT | Performed by: FAMILY MEDICINE

## 2021-11-30 PROCEDURE — 80053 COMPREHEN METABOLIC PANEL: CPT

## 2021-11-30 PROCEDURE — 80061 LIPID PANEL: CPT

## 2021-11-30 PROCEDURE — 82306 VITAMIN D 25 HYDROXY: CPT

## 2021-11-30 PROCEDURE — 99396 PREV VISIT EST AGE 40-64: CPT | Performed by: FAMILY MEDICINE

## 2021-11-30 PROCEDURE — 3074F SYST BP LT 130 MM HG: CPT | Performed by: FAMILY MEDICINE

## 2021-11-30 PROCEDURE — 85025 COMPLETE CBC W/AUTO DIFF WBC: CPT

## 2021-11-30 PROCEDURE — 3008F BODY MASS INDEX DOCD: CPT | Performed by: FAMILY MEDICINE

## 2021-11-30 RX ORDER — ALPRAZOLAM 0.25 MG/1
TABLET ORAL
Qty: 30 TABLET | Refills: 0 | Status: SHIPPED | OUTPATIENT
Start: 2021-11-30 | End: 2022-01-25

## 2021-11-30 NOTE — PROGRESS NOTES
Patient ID: Juanita Raphael is a 64year old female. HPI  Patient presents with:  Physical    Last seen by me on 6/18/2021. Pt returned from [place] 2 weeks ago. Pt works as a  and .      Pt c/o intermittent dizziness which she i 07/21/2020    MCHC 33.0 07/21/2020    RDW 12.6 07/21/2020    NEPRELIM 4.68 07/21/2020    NEPERCENT 67.7 07/21/2020    LYPERCENT 22.3 07/21/2020    MOPERCENT 8.0 07/21/2020    EOPERCENT 1.3 07/21/2020    BAPERCENT 0.6 07/21/2020    NE 4.68 07/21/2020    LYM 25.59 kg/m²  06/18/21 : 24.80 kg/m²  04/27/21 : 25.02 kg/m²  04/05/21 : 25.02 kg/m²  12/09/20 : 25.20 kg/m²      BP Readings from Last 6 Encounters:  11/30/21 : 128/75  09/14/21 : 112/58  06/18/21 : 125/69  04/27/21 : 150/76  04/05/21 : 125/75  12/09/20 : cup daily        Occupational Exposure: Not Asked        Hobby Hazards: Not Asked        Sleep Concern: Not Asked        Stress Concern: Not Asked        Weight Concern: Not Asked        Special Diet: Not Asked        Back Care: Not Asked        Exercise: reflexes. No cranial nerve deficit. Skin: Skin is warm and dry. No rash noted. Psychiatric: She has a normal mood and affect. Lower legs: No edema of the legs bilaterally. Vitals reviewed.     Blood pressure 128/75, pulse 74, temperature 98.4 °F (36

## 2022-01-25 DIAGNOSIS — F41.9 ANXIETY: ICD-10-CM

## 2022-01-25 RX ORDER — ALPRAZOLAM 0.25 MG/1
TABLET ORAL
Qty: 30 TABLET | Refills: 0 | Status: SHIPPED | OUTPATIENT
Start: 2022-01-25

## 2022-01-25 NOTE — TELEPHONE ENCOUNTER
Please review; protocol failed/no protocol    Requested Prescriptions   Pending Prescriptions Disp Refills    ALPRAZOLAM 0.25 MG Oral Tab [Pharmacy Med Name: ALPRAZOLAM 0.25MG TABLETS] 30 tablet 0     Sig: TAKE 1 TABLET BY MOUTH EVERY NIGHT AS NEEDED FOR S

## 2022-03-09 NOTE — TELEPHONE ENCOUNTER
Please review. Protocol failed/ No protocol      Requested Prescriptions   Pending Prescriptions Disp Refills    ATORVASTATIN 20 MG Oral Tab [Pharmacy Med Name: ATORVASTATIN 20MG TABLETS] 90 tablet 0     Sig: TAKE 1 TABLET BY MOUTH EVERY NIGHT FOR CHOLESTEROL        Cholesterol Medication Protocol Failed - 3/9/2022 10:12 AM        Failed - Last LDL < 130     Lab Results   Component Value Date     (H) 11/30/2021               Passed - ALT in past 12 months        Passed - LDL in past 12 months        Passed - Last ALT < 80       Lab Results   Component Value Date    ALT 39 11/30/2021             Passed - Appointment in past 12 or next 3 months                   Recent Outpatient Visits              3 months ago Adult general medical exam    [address], [address], [place], DO    Office Visit    5 months ago Colon cancer screening    [address] [facility] [place], [facility] [place], APRN    Office Visit    8 months ago [facility] [place] [address], [address], [place], DO    Office Visit    10 months ago Rhinitis medicamentosa    [facility] for [address] [place] [address], Nicolás Matthews MD    Office Visit    11 months ago Other allergic rhinitis    [address], [address], [place], [place]    Office Visit

## 2022-03-10 RX ORDER — ATORVASTATIN CALCIUM 20 MG/1
TABLET, FILM COATED ORAL
Qty: 90 TABLET | Refills: 0 | Status: SHIPPED | OUTPATIENT
Start: 2022-03-10

## 2022-06-07 DIAGNOSIS — E78.2 MIXED HYPERLIPIDEMIA: ICD-10-CM

## 2022-06-07 DIAGNOSIS — E55.9 VITAMIN D DEFICIENCY: ICD-10-CM

## 2022-06-07 RX ORDER — ERGOCALCIFEROL 1.25 MG/1
CAPSULE ORAL
Qty: 12 CAPSULE | Refills: 1 | OUTPATIENT
Start: 2022-06-07

## 2022-06-08 DIAGNOSIS — F41.9 ANXIETY: ICD-10-CM

## 2022-06-08 NOTE — TELEPHONE ENCOUNTER
Patient calling, confirmed name and . Informed of below. Patient verbalized understanding and agrees.

## 2022-06-09 NOTE — TELEPHONE ENCOUNTER
Please review. Protocol failed / No protocol.     Requested Prescriptions   Pending Prescriptions Disp Refills    ATORVASTATIN 20 MG Oral Tab [Pharmacy Med Name: ATORVASTATIN 20MG TABLETS] 90 tablet 0     Sig: TAKE 1 TABLET BY MOUTH EVERY NIGHT FOR CHOLESTEROL        Cholesterol Medication Protocol Failed - 6/7/2022  5:05 PM        Failed - Last LDL < 130     Lab Results   Component Value Date     (H) 11/30/2021               Passed - ALT in past 12 months        Passed - LDL in past 12 months        Passed - Last ALT < 80       Lab Results   Component Value Date    ALT 39 11/30/2021             Passed - Appointment in past 12 or next 3 months                  Recent Outpatient Visits              6 months ago Adult general medical exam    [facility], [address], [address], [place], DO    Office Visit    8 months ago Colon cancer screening    [facility], [facility], [address] [facility], Tien Casey APRN    Office Visit    11 months ago [address] [place] [address] [address], [address], [address], [place], [place]    Office Visit    1 year ago Rhinitis medicamentosa    [facility] for [address] [place] [address], Omer Zepeda MD    Office Visit    1 year ago Other allergic rhinitis    [place] [facility], [address], [address], [place], [place]    Office Visit

## 2022-06-10 RX ORDER — ATORVASTATIN CALCIUM 20 MG/1
TABLET, FILM COATED ORAL
Qty: 90 TABLET | Refills: 0 | Status: SHIPPED | OUTPATIENT
Start: 2022-06-10

## 2022-06-10 RX ORDER — ALPRAZOLAM 0.25 MG/1
TABLET ORAL
Qty: 30 TABLET | Refills: 0 | Status: SHIPPED | OUTPATIENT
Start: 2022-06-10

## 2022-08-11 DIAGNOSIS — F41.9 ANXIETY: ICD-10-CM

## 2022-08-11 RX ORDER — ALPRAZOLAM 0.25 MG/1
TABLET ORAL
Qty: 30 TABLET | Refills: 0 | Status: SHIPPED | OUTPATIENT
Start: 2022-08-11

## 2022-08-24 ENCOUNTER — VIRTUAL PHONE E/M (OUTPATIENT)
Dept: FAMILY MEDICINE CLINIC | Facility: CLINIC | Age: 62
End: 2022-08-24
Payer: COMMERCIAL

## 2022-08-24 ENCOUNTER — NURSE TRIAGE (OUTPATIENT)
Dept: FAMILY MEDICINE CLINIC | Facility: CLINIC | Age: 62
End: 2022-08-24

## 2022-08-24 DIAGNOSIS — J06.9 VIRAL UPPER RESPIRATORY TRACT INFECTION: Primary | ICD-10-CM

## 2022-08-24 PROCEDURE — 99213 OFFICE O/P EST LOW 20 MIN: CPT | Performed by: NURSE PRACTITIONER

## 2022-08-24 RX ORDER — GUAIFENESIN AND CODEINE PHOSPHATE 100; 10 MG/5ML; MG/5ML
SOLUTION ORAL
Qty: 180 ML | Refills: 0 | Status: SHIPPED | OUTPATIENT
Start: 2022-08-24

## 2022-08-24 RX ORDER — BENZONATATE 200 MG/1
200 CAPSULE ORAL 3 TIMES DAILY PRN
Qty: 30 CAPSULE | Refills: 0 | Status: SHIPPED | OUTPATIENT
Start: 2022-08-24

## 2022-08-24 NOTE — ASSESSMENT & PLAN NOTE
cheratussin ac 1-2 tsp po qid as needed-will cause drowsiness  Tessalon perles 200 mg I po three times per day as needed  Supportive care discussed to help alleviate symptoms  Please call if symptoms worsen or are not resolving. Discussed w pt red flag symptoms that if they occur, pt should immediately go to ER. Pt states understanding.

## 2022-08-31 ENCOUNTER — TELEPHONE (OUTPATIENT)
Dept: FAMILY MEDICINE CLINIC | Facility: CLINIC | Age: 62
End: 2022-08-31

## 2022-08-31 NOTE — TELEPHONE ENCOUNTER
Patient calling with condition update. Seen by Franklyn Shone  (video) 8/24/2022. Given cough meds as symptoms were thought to be viral.  Now she believes she has a sinus infection. Is asking for antibiotic. Please advise. Symptoms:  Cough, nasal congestion, headache. Took a Covid test--negative. DENIES chest pain, shortness of breath, fever, sore throat.

## 2022-09-01 NOTE — TELEPHONE ENCOUNTER
Patient notified of provider's recommendation. Patient verbalized understanding. 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 Explained provider will not send RX.

## 2022-09-06 ENCOUNTER — OFFICE VISIT (OUTPATIENT)
Dept: FAMILY MEDICINE CLINIC | Facility: CLINIC | Age: 62
End: 2022-09-06
Payer: COMMERCIAL

## 2022-09-06 VITALS
BODY MASS INDEX: 24.76 KG/M2 | HEART RATE: 68 BPM | WEIGHT: 138 LBS | HEIGHT: 62.5 IN | SYSTOLIC BLOOD PRESSURE: 122 MMHG | DIASTOLIC BLOOD PRESSURE: 78 MMHG

## 2022-09-06 DIAGNOSIS — J01.00 ACUTE NON-RECURRENT MAXILLARY SINUSITIS: Primary | ICD-10-CM

## 2022-09-06 DIAGNOSIS — R09.81 NASAL CONGESTION: ICD-10-CM

## 2022-09-06 PROCEDURE — 3078F DIAST BP <80 MM HG: CPT | Performed by: PHYSICIAN ASSISTANT

## 2022-09-06 PROCEDURE — 3074F SYST BP LT 130 MM HG: CPT | Performed by: PHYSICIAN ASSISTANT

## 2022-09-06 PROCEDURE — 99213 OFFICE O/P EST LOW 20 MIN: CPT | Performed by: PHYSICIAN ASSISTANT

## 2022-09-06 PROCEDURE — 3008F BODY MASS INDEX DOCD: CPT | Performed by: PHYSICIAN ASSISTANT

## 2022-09-06 RX ORDER — FLUTICASONE PROPIONATE 50 MCG
2 SPRAY, SUSPENSION (ML) NASAL DAILY
Qty: 16 G | Refills: 0 | Status: SHIPPED | OUTPATIENT
Start: 2022-09-06 | End: 2022-10-06

## 2022-09-06 RX ORDER — FLUTICASONE PROPIONATE 50 MCG
SPRAY, SUSPENSION (ML) NASAL
Qty: 48 G | Refills: 0 | OUTPATIENT
Start: 2022-09-06

## 2022-09-06 RX ORDER — AMOXICILLIN 875 MG/1
875 TABLET, COATED ORAL 2 TIMES DAILY
Qty: 20 TABLET | Refills: 0 | Status: SHIPPED | OUTPATIENT
Start: 2022-09-06 | End: 2022-09-16

## 2022-09-15 DIAGNOSIS — E78.2 MIXED HYPERLIPIDEMIA: ICD-10-CM

## 2022-09-19 RX ORDER — ATORVASTATIN CALCIUM 20 MG/1
TABLET, FILM COATED ORAL
Qty: 90 TABLET | Refills: 0 | Status: SHIPPED | OUTPATIENT
Start: 2022-09-19

## 2022-09-19 NOTE — TELEPHONE ENCOUNTER
Please Review. Protocol Failed or has no protocol.        Requested Prescriptions   Pending Prescriptions Disp Refills    ATORVASTATIN 20 MG Oral Tab [Pharmacy Med Name: ATORVASTATIN 20MG TABLETS] 90 tablet 0     Sig: TAKE 1 TABLET BY MOUTH EVERY NIGHT FOR CHOLESTEROL        Cholesterol Medication Protocol Failed - 9/15/2022 10:07 AM        Failed - Last LDL < 130     Lab Results   Component Value Date     (H) 11/30/2021               Passed - ALT in past 12 months        Passed - LDL in past 12 months        Passed - Last ALT < 80       Lab Results   Component Value Date    ALT 39 11/30/2021             Passed - In person appointment or virtual visit in the past 12 mos or appointment in next 3 mos       Recent Outpatient Visits              1 week ago Acute non-recurrent maxillary sinusitis    [facility], PA-C    Office Visit    3 weeks ago Viral upper respiratory tract infection    [address], [address], Antoni Sebastian, APRN    Virtual Phone E/M    9 months ago Adult general medical exam    [address], [address], [place], DO    Office Visit    1 year ago Colon cancer screening    [address], [facility], [facility], APRN    Office Visit    1 year ago [address], [address], [address], [place], DO    Office Visit     Future Appointments         Provider Department Appt Notes    In 1 week Madeline Snow, [facility], [facility], [facility] Visits              1 week ago Acute non-recurrent maxillary sinusitis    [address] [facility] Marco Antonio, PA-C    Office Visit    3 weeks ago Viral upper respiratory tract infection    [address], [address], [address], Francene [place], APRN    Virtual Phone E/M    9 months ago Adult general medical exam    [address], [facility] Juan C Naylor, DO    Office Visit    1 year ago Colon cancer screening    [facility], [facility], [facility] [place], Tommy Broderick, [employer]    1 year ago [address] [facility], [address], [place], DO    Office Visit          Future Appointments         Provider Department Appt Notes    In 1 week [address], [address] [facility], [place] [address], [facility]

## 2022-09-28 ENCOUNTER — TELEPHONE (OUTPATIENT)
Dept: GASTROENTEROLOGY | Facility: CLINIC | Age: 62
End: 2022-09-28

## 2022-09-28 ENCOUNTER — OFFICE VISIT (OUTPATIENT)
Dept: GASTROENTEROLOGY | Facility: CLINIC | Age: 62
End: 2022-09-28

## 2022-09-28 VITALS
SYSTOLIC BLOOD PRESSURE: 124 MMHG | HEIGHT: 62.5 IN | HEART RATE: 76 BPM | DIASTOLIC BLOOD PRESSURE: 74 MMHG | BODY MASS INDEX: 25.48 KG/M2 | WEIGHT: 142 LBS

## 2022-09-28 DIAGNOSIS — R12 HEARTBURN: ICD-10-CM

## 2022-09-28 DIAGNOSIS — R14.0 BLOATING: ICD-10-CM

## 2022-09-28 DIAGNOSIS — Z12.11 COLON CANCER SCREENING: Primary | ICD-10-CM

## 2022-09-28 DIAGNOSIS — Z86.010 PERSONAL HISTORY OF COLONIC POLYPS: ICD-10-CM

## 2022-09-28 PROCEDURE — 99215 OFFICE O/P EST HI 40 MIN: CPT | Performed by: NURSE PRACTITIONER

## 2022-09-28 PROCEDURE — 3074F SYST BP LT 130 MM HG: CPT | Performed by: NURSE PRACTITIONER

## 2022-09-28 PROCEDURE — 3078F DIAST BP <80 MM HG: CPT | Performed by: NURSE PRACTITIONER

## 2022-09-28 PROCEDURE — 3008F BODY MASS INDEX DOCD: CPT | Performed by: NURSE PRACTITIONER

## 2022-09-28 RX ORDER — SODIUM, POTASSIUM,MAG SULFATES 17.5-3.13G
SOLUTION, RECONSTITUTED, ORAL ORAL
Qty: 2880 ML | Refills: 0 | Status: SHIPPED | OUTPATIENT
Start: 2022-09-28

## 2022-09-28 RX ORDER — POLYETHYLENE GLYCOL 3350, SODIUM CHLORIDE, SODIUM BICARBONATE, POTASSIUM CHLORIDE 420; 11.2; 5.72; 1.48 G/4L; G/4L; G/4L; G/4L
POWDER, FOR SOLUTION ORAL
Qty: 4000 ML | Refills: 0 | Status: SHIPPED | OUTPATIENT
Start: 2022-09-28

## 2022-09-28 RX ORDER — DIPHENHYDRAMINE HYDROCHLORIDE 25 MG/1
25 TABLET, FILM COATED ORAL NIGHTLY PRN
COMMUNITY

## 2022-09-28 RX ORDER — PYRIDOXINE HCL (VITAMIN B6) 25 MG
25 TABLET ORAL DAILY
COMMUNITY

## 2022-09-28 RX ORDER — PHENOL 1.4 %
AEROSOL, SPRAY (ML) MUCOUS MEMBRANE
COMMUNITY

## 2022-09-28 NOTE — TELEPHONE ENCOUNTER
Scheduled for: Colonoscopy [facility]  Provider Name: Dr Keagan Bynum   Date: Wed 12/21/2022   Location: [facility]   Sedation: MAC  Time: 10:15 am  Prep: suprep   Meds/Allergies Reconciled?:  NKDA  Diagnosis with codes: CRC screening Z12.11, HCP Z86.010, bloating R14.0, heartburn R12   Was patient informed to call insurance with codes (Y/N):  Yes  Referral sent?:  Yes  [facility] or [facility] [address]  notified?: I sent an electronic request to Endo Scheduling and received a confirmation today. Medication Orders:  Pt is aware to NOT take iron pills, herbal meds and diet supplements for 7 days before exam. Also to NOT take any form of alcohol, recreational drugs and any forms of ED meds 24 hours before exam.        Misc Orders:  Patient was informed that they will need a COVID 19 test prior to their procedure. Patient verbally understood & will await a phone call from [facility] to schedule. Further instructions given by staff:   Instructions given and pt verbalized understanding

## 2022-11-30 DIAGNOSIS — E78.2 MIXED HYPERLIPIDEMIA: ICD-10-CM

## 2022-12-01 RX ORDER — ATORVASTATIN CALCIUM 20 MG/1
TABLET, FILM COATED ORAL
Qty: 90 TABLET | Refills: 0 | Status: SHIPPED | OUTPATIENT
Start: 2022-12-01

## 2022-12-05 ENCOUNTER — TELEMEDICINE (OUTPATIENT)
Dept: FAMILY MEDICINE CLINIC | Facility: CLINIC | Age: 62
End: 2022-12-05

## 2022-12-05 ENCOUNTER — NURSE TRIAGE (OUTPATIENT)
Dept: FAMILY MEDICINE CLINIC | Facility: CLINIC | Age: 62
End: 2022-12-05

## 2022-12-05 DIAGNOSIS — J11.1 INFLUENZA: Primary | ICD-10-CM

## 2022-12-05 RX ORDER — OSELTAMIVIR PHOSPHATE 75 MG/1
75 CAPSULE ORAL 2 TIMES DAILY
Qty: 10 CAPSULE | Refills: 0 | Status: SHIPPED | OUTPATIENT
Start: 2022-12-05

## 2022-12-05 RX ORDER — IBUPROFEN 800 MG/1
800 TABLET ORAL 3 TIMES DAILY
Qty: 30 TABLET | Refills: 0 | Status: SHIPPED | OUTPATIENT
Start: 2022-12-05 | End: 2023-11-30

## 2022-12-05 NOTE — PROGRESS NOTES
not currently breastfeeding. Video visit    1 day history of nocturnal cough with body aches. Also frontal headache. Fever of 100.1. COVID test at home is negative denies dyspnea. No asthma history quit smoking a more than 10 years ago. No allergies. Some chest pain with a cough no sore throat. Ears hurt. Has not yet had a flu shot this year. No hypertension. Objective patient is comfortable no apparent distress    Assessment likely influenza    Plan start Tamiflu also NyQuil at night with ibuprofen for pain    Vaporizer in bedroom    Advised patient to go to ER if breathing worsens    Otherwise follow-up with me in 1 to 2 days if no improvement.

## 2022-12-14 ENCOUNTER — TELEPHONE (OUTPATIENT)
Facility: CLINIC | Age: 62
End: 2022-12-14

## 2022-12-14 DIAGNOSIS — Z12.11 COLON CANCER SCREENING: Primary | ICD-10-CM

## 2022-12-14 DIAGNOSIS — Z86.010 PERSONAL HISTORY OF COLONIC POLYPS: ICD-10-CM

## 2022-12-14 DIAGNOSIS — R14.0 BLOATING: ICD-10-CM

## 2022-12-14 DIAGNOSIS — R12 HEARTBURN: ICD-10-CM

## 2023-01-27 ENCOUNTER — TELEPHONE (OUTPATIENT)
Facility: CLINIC | Age: 63
End: 2023-01-27

## 2023-01-27 DIAGNOSIS — R12 HEARTBURN: Primary | ICD-10-CM

## 2023-02-02 ENCOUNTER — TELEPHONE (OUTPATIENT)
Facility: CLINIC | Age: 63
End: 2023-02-02

## 2023-02-02 DIAGNOSIS — Z12.11 COLON CANCER SCREENING: Primary | ICD-10-CM

## 2023-02-02 DIAGNOSIS — Z86.010 HISTORY OF COLON POLYPS: ICD-10-CM

## 2023-02-02 DIAGNOSIS — R12 HEARTBURN: ICD-10-CM

## 2023-02-02 DIAGNOSIS — R14.0 BLOATING: ICD-10-CM

## 2023-02-02 NOTE — TELEPHONE ENCOUNTER
Pt needs to reschedule colonoscopy. Pt is currently trying to make travel plans and request a call back ASAP.   Please call

## 2023-02-15 DIAGNOSIS — E78.2 MIXED HYPERLIPIDEMIA: ICD-10-CM

## 2023-02-16 NOTE — TELEPHONE ENCOUNTER
Please review. Protocol failed/ No protocol.     Requested Prescriptions   Pending Prescriptions Disp Refills    atorvastatin 20 MG Oral Tab [Pharmacy Med Name: ATORVASTATIN 20MG TABLETS] 90 tablet 1     Sig: TAKE 1 TABLET BY MOUTH EVERY NIGHT FOR CHOLESTEROL       Cholesterol Medication Protocol Failed - 2/15/2023  2:08 PM        Failed - ALT in past 12 months        Failed - LDL in past 12 months        Failed - Last ALT < 80     Lab Results   Component Value Date    ALT 39 11/30/2021             Failed - Last LDL < 130     Lab Results   Component Value Date     (H) 11/30/2021             Passed - In person appointment or virtual visit in the past 12 mos or appointment in next 3 mos     Recent Outpatient Visits              2 months ago Influenza    [address], [address], [place] Zebedee DO Tra    Telemedicine    4 months ago Colon cancer screening    [address] [facility], Rosanne Reed, CHRISTOPHER    Office Visit    5 months ago Acute non-recurrent maxillary sinusitis    [address], [address], [place] Darcus Fearing, [employer]    Office Visit    5 months ago Viral upper respiratory tract infection    [facility], [address], [facility], APRN    Virtual Phone E/M    1 year ago Adult general medical exam    [address], [address], [address], [place], DO    Office Visit          Future Appointments         Provider Department Appt Notes    In 2 months [address], [address] [facility] [address], [address] [facility] [address], [address] [facility]  w/MAC @ [address] [place] [address] Visits              2 months ago Influenza    [address], [address], [place], Marlin Paget,     Telemedicine    4 months ago Colon cancer screening    [address] [facility], Rosanne Reed, APRN    Office Visit    5 months ago Acute non-recurrent maxillary sinusitis    [facility], [place] Elizabethann Chi, [place]    Office Visit    5 months ago Viral upper respiratory tract infection    [facility], [address], [place] Corinne Luna, APRN    Virtual Phone E/M    1 year ago Adult general medical exam    [facility], [address], [place],     Office Visit            Future Appointments         Provider Department Appt Notes    In 2 months [address], [address], [address] [place] [address],[address], [place] [facility]  w/MAC @ [employer]

## 2023-02-17 RX ORDER — ATORVASTATIN CALCIUM 20 MG/1
TABLET, FILM COATED ORAL
Qty: 90 TABLET | Refills: 0 | Status: SHIPPED | OUTPATIENT
Start: 2023-02-17

## 2023-04-11 NOTE — PAT NURSING NOTE
[facility] address provided to patient along with arrival time. All questions answered.

## 2023-04-17 ENCOUNTER — TELEPHONE (OUTPATIENT)
Facility: CLINIC | Age: 63
End: 2023-04-17

## 2023-04-17 NOTE — TELEPHONE ENCOUNTER
Spoke to patient and clarified prep drinking times. Also resent instructions via Mychart. All questions answered. Patient verbalized understanding.

## 2023-04-19 ENCOUNTER — ANESTHESIA (OUTPATIENT)
Dept: ENDOSCOPY | Age: 63
End: 2023-04-19
Payer: COMMERCIAL

## 2023-04-19 ENCOUNTER — HOSPITAL ENCOUNTER (OUTPATIENT)
Age: 63
Setting detail: HOSPITAL OUTPATIENT SURGERY
Discharge: HOME | End: 2023-04-19
Attending: INTERNAL MEDICINE | Admitting: INTERNAL MEDICINE
Payer: COMMERCIAL

## 2023-04-19 ENCOUNTER — ANESTHESIA EVENT (OUTPATIENT)
Dept: ENDOSCOPY | Age: 63
End: 2023-04-19
Payer: COMMERCIAL

## 2023-04-19 VITALS
OXYGEN SATURATION: 94 % | SYSTOLIC BLOOD PRESSURE: 136 MMHG | HEART RATE: 57 BPM | HEIGHT: 62.5 IN | RESPIRATION RATE: 16 BRPM | DIASTOLIC BLOOD PRESSURE: 57 MMHG | WEIGHT: 140 LBS | BODY MASS INDEX: 25.12 KG/M2

## 2023-04-19 DIAGNOSIS — Z12.11 COLON CANCER SCREENING: ICD-10-CM

## 2023-04-19 DIAGNOSIS — Z86.010 PERSONAL HISTORY OF COLONIC POLYPS: ICD-10-CM

## 2023-04-19 DIAGNOSIS — R14.0 BLOATING: ICD-10-CM

## 2023-04-19 DIAGNOSIS — R12 HEARTBURN: ICD-10-CM

## 2023-04-19 PROCEDURE — 99070 SPECIAL SUPPLIES PHYS/QHP: CPT | Performed by: INTERNAL MEDICINE

## 2023-04-19 PROCEDURE — 45385 COLONOSCOPY W/LESION REMOVAL: CPT | Performed by: INTERNAL MEDICINE

## 2023-04-19 RX ORDER — LIDOCAINE HYDROCHLORIDE 10 MG/ML
INJECTION, SOLUTION EPIDURAL; INFILTRATION; INTRACAUDAL; PERINEURAL AS NEEDED
Status: DISCONTINUED | OUTPATIENT
Start: 2023-04-19 | End: 2023-04-19 | Stop reason: SURG

## 2023-04-19 RX ORDER — SODIUM CHLORIDE, SODIUM LACTATE, POTASSIUM CHLORIDE, CALCIUM CHLORIDE 600; 310; 30; 20 MG/100ML; MG/100ML; MG/100ML; MG/100ML
INJECTION, SOLUTION INTRAVENOUS CONTINUOUS
Status: DISCONTINUED | OUTPATIENT
Start: 2023-04-19 | End: 2023-04-19

## 2023-04-19 RX ADMIN — LIDOCAINE HYDROCHLORIDE 50 MG: 10 INJECTION, SOLUTION EPIDURAL; INFILTRATION; INTRACAUDAL; PERINEURAL at 13:10:00

## 2023-04-19 RX ADMIN — SODIUM CHLORIDE, SODIUM LACTATE, POTASSIUM CHLORIDE, CALCIUM CHLORIDE: 600; 310; 30; 20 INJECTION, SOLUTION INTRAVENOUS at 13:09:00

## 2023-04-19 NOTE — ANESTHESIA POSTPROCEDURE EVALUATION
Patient: Hallie Faust    Procedure Summary     Date: 04/19/23 Room / Location: [facility] ENDOSCOPY 02 / [facility] ENDO    Anesthesia Start: 9606 Anesthesia Stop: 5650    Procedure: COLONOSCOPY Diagnosis:       Colon cancer screening      Personal history of colonic polyps      Bloating      Heartburn      (colon polyps, diverticulosis, hemorrhoids, tortuous colon)    Surgeons: Samm Mcneil MD Anesthesiologist:     Anesthesia Type: general ASA Status: 2          Anesthesia Type: general    Vitals Value Taken Time   /50 04/19/23 1340   Temp  04/19/23 1340   Pulse 67 04/19/23 1340   Resp 17 04/19/23 1340   SpO2 100 % 04/19/23 1340       [facility] AN Post Evaluation:   Patient Evaluated in PACU  Patient Participation: complete - patient participated  Level of Consciousness: awake and alert  Pain Management: adequate  Airway Patency:patent  Yes    Cardiovascular Status: acceptable  Respiratory Status: acceptable      Leslie Thomas MD  4/19/2023 1:40 PM

## 2023-04-19 NOTE — DISCHARGE INSTRUCTIONS
Home Care Instructions for Colonoscopy and/or Gastroscopy with Sedation    Diet:  - Resume your regular diet as tolerated unless otherwise instructed. - Start with light meals to minimize bloating.  - Do not drink alcohol today. Medication:  - If you have questions about resuming your normal medications, please contact your Primary Care Physician. Activities:  - Take it easy today. Do not return to work today. - Do not drive today. - Do not operate any machinery today (including kitchen equipment). -     Don't sign any legal documents or make critical decisions. Colonoscopy:  - You may notice some rectal \"spotting\" (a little blood on the toilet tissue) for a day or two after the exam. This is normal.  - If you experience any rectal bleeding (not spotting), persistent tenderness or sharp severe abdominal pains, oral temperature over 100 degrees Fahrenheit, light-headedness or dizziness, or any other problems, contact your doctor. **If unable to reach your doctor, please go to the [facility] Emergency Room**    - Your referring physician will receive a full report of your examination.  - If you do not hear from your doctor's office within two weeks of your biopsy, please call them for your results.

## 2023-05-03 ENCOUNTER — TELEPHONE (OUTPATIENT)
Dept: GASTROENTEROLOGY | Facility: CLINIC | Age: 63
End: 2023-05-03

## 2023-05-03 NOTE — TELEPHONE ENCOUNTER
I mailed out colonoscopy results letter to pt  Updated health maintenance  Entered into 3 yr CLN recall  Recall colon in 3 years per.  Colon done 4/19/2023    Esequiel Victoria MD  P Em Gi Clinical Staff  GI staff: please place recall for colonoscopy in 3 years

## 2023-05-16 ENCOUNTER — HOSPITAL ENCOUNTER (OUTPATIENT)
Dept: GENERAL RADIOLOGY | Age: 63
Discharge: HOME | End: 2023-05-16
Attending: FAMILY MEDICINE
Payer: COMMERCIAL

## 2023-05-16 ENCOUNTER — OFFICE VISIT (OUTPATIENT)
Dept: FAMILY MEDICINE CLINIC | Facility: CLINIC | Age: 63
End: 2023-05-16

## 2023-05-16 VITALS
DIASTOLIC BLOOD PRESSURE: 80 MMHG | TEMPERATURE: 97 F | SYSTOLIC BLOOD PRESSURE: 154 MMHG | BODY MASS INDEX: 25.3 KG/M2 | HEART RATE: 65 BPM | WEIGHT: 141 LBS | HEIGHT: 62.5 IN

## 2023-05-16 DIAGNOSIS — R03.0 ELEVATED BLOOD PRESSURE READING: ICD-10-CM

## 2023-05-16 DIAGNOSIS — M25.561 ACUTE PAIN OF RIGHT KNEE: ICD-10-CM

## 2023-05-16 DIAGNOSIS — M25.561 ACUTE PAIN OF RIGHT KNEE: Primary | ICD-10-CM

## 2023-05-16 DIAGNOSIS — F41.9 ANXIETY: ICD-10-CM

## 2023-05-16 DIAGNOSIS — E78.2 MIXED HYPERLIPIDEMIA: ICD-10-CM

## 2023-05-16 PROCEDURE — 3077F SYST BP >= 140 MM HG: CPT | Performed by: FAMILY MEDICINE

## 2023-05-16 PROCEDURE — 3008F BODY MASS INDEX DOCD: CPT | Performed by: FAMILY MEDICINE

## 2023-05-16 PROCEDURE — 73564 X-RAY EXAM KNEE 4 OR MORE: CPT | Performed by: FAMILY MEDICINE

## 2023-05-16 PROCEDURE — 3079F DIAST BP 80-89 MM HG: CPT | Performed by: FAMILY MEDICINE

## 2023-05-16 PROCEDURE — 99214 OFFICE O/P EST MOD 30 MIN: CPT | Performed by: FAMILY MEDICINE

## 2023-05-16 RX ORDER — ATORVASTATIN CALCIUM 20 MG/1
TABLET, FILM COATED ORAL
Qty: 90 TABLET | Refills: 0 | Status: SHIPPED | OUTPATIENT
Start: 2023-05-16

## 2023-05-16 RX ORDER — CELECOXIB 200 MG/1
200 CAPSULE ORAL DAILY
Qty: 30 CAPSULE | Refills: 0 | Status: SHIPPED | OUTPATIENT
Start: 2023-05-16 | End: 2023-06-15

## 2023-05-16 RX ORDER — ALPRAZOLAM 0.25 MG/1
0.25 TABLET ORAL NIGHTLY PRN
Qty: 30 TABLET | Refills: 0 | Status: SHIPPED | OUTPATIENT
Start: 2023-05-16

## 2023-05-23 ENCOUNTER — LAB ENCOUNTER (OUTPATIENT)
Dept: LAB | Age: 63
End: 2023-05-23
Attending: FAMILY MEDICINE
Payer: COMMERCIAL

## 2023-05-23 ENCOUNTER — OFFICE VISIT (OUTPATIENT)
Dept: FAMILY MEDICINE CLINIC | Facility: CLINIC | Age: 63
End: 2023-05-23

## 2023-05-23 VITALS
WEIGHT: 140 LBS | DIASTOLIC BLOOD PRESSURE: 74 MMHG | SYSTOLIC BLOOD PRESSURE: 112 MMHG | BODY MASS INDEX: 25.12 KG/M2 | HEIGHT: 62.5 IN | HEART RATE: 69 BPM | TEMPERATURE: 98 F

## 2023-05-23 DIAGNOSIS — E55.9 VITAMIN D DEFICIENCY: ICD-10-CM

## 2023-05-23 DIAGNOSIS — L81.4 SOLAR LENTIGO: ICD-10-CM

## 2023-05-23 DIAGNOSIS — L57.8 SUN-DAMAGED SKIN: ICD-10-CM

## 2023-05-23 DIAGNOSIS — Z23 NEED FOR VACCINATION: ICD-10-CM

## 2023-05-23 DIAGNOSIS — E78.2 MIXED HYPERLIPIDEMIA: ICD-10-CM

## 2023-05-23 DIAGNOSIS — K63.5 POLYP OF COLON, UNSPECIFIED PART OF COLON, UNSPECIFIED TYPE: ICD-10-CM

## 2023-05-23 DIAGNOSIS — Z12.31 VISIT FOR SCREENING MAMMOGRAM: ICD-10-CM

## 2023-05-23 DIAGNOSIS — Z00.00 ADULT GENERAL MEDICAL EXAM: Primary | ICD-10-CM

## 2023-05-23 DIAGNOSIS — Z00.00 ADULT GENERAL MEDICAL EXAM: ICD-10-CM

## 2023-05-23 LAB
ALBUMIN SERPL-MCNC: 3.9 G/DL (ref 3.4–5)
ALBUMIN/GLOB SERPL: 1.2 {RATIO} (ref 1–2)
ALP LIVER SERPL-CCNC: 67 U/L
ALT SERPL-CCNC: 31 U/L
ANION GAP SERPL CALC-SCNC: 6 MMOL/L (ref 0–18)
AST SERPL-CCNC: 19 U/L (ref 15–37)
BASOPHILS # BLD AUTO: 0.03 X10(3) UL (ref 0–0.2)
BASOPHILS NFR BLD AUTO: 0.8 %
BILIRUB SERPL-MCNC: 0.6 MG/DL (ref 0.1–2)
BUN BLD-MCNC: 12 MG/DL (ref 7–18)
BUN/CREAT SERPL: 19.7 (ref 10–20)
CALCIUM BLD-MCNC: 9.5 MG/DL (ref 8.5–10.1)
CHLORIDE SERPL-SCNC: 109 MMOL/L (ref 98–112)
CHOLEST SERPL-MCNC: 195 MG/DL (ref ?–200)
CO2 SERPL-SCNC: 27 MMOL/L (ref 21–32)
CREAT BLD-MCNC: 0.61 MG/DL
DEPRECATED RDW RBC AUTO: 44.9 FL (ref 35.1–46.3)
EOSINOPHIL # BLD AUTO: 0.14 X10(3) UL (ref 0–0.7)
EOSINOPHIL NFR BLD AUTO: 3.6 %
ERYTHROCYTE [DISTWIDTH] IN BLOOD BY AUTOMATED COUNT: 12.9 % (ref 11–15)
FASTING PATIENT LIPID ANSWER: YES
FASTING STATUS PATIENT QL REPORTED: YES
GFR SERPLBLD BASED ON 1.73 SQ M-ARVRAT: 101 ML/MIN/1.73M2 (ref 60–?)
GLOBULIN PLAS-MCNC: 3.3 G/DL (ref 2.8–4.4)
GLUCOSE BLD-MCNC: 102 MG/DL (ref 70–99)
HCT VFR BLD AUTO: 42.5 %
HDLC SERPL-MCNC: 62 MG/DL (ref 40–59)
HGB BLD-MCNC: 13.7 G/DL
IMM GRANULOCYTES # BLD AUTO: 0.01 X10(3) UL (ref 0–1)
IMM GRANULOCYTES NFR BLD: 0.3 %
LDLC SERPL CALC-MCNC: 108 MG/DL (ref ?–100)
LYMPHOCYTES # BLD AUTO: 1.6 X10(3) UL (ref 1–4)
LYMPHOCYTES NFR BLD AUTO: 40.9 %
MCH RBC QN AUTO: 30.5 PG (ref 26–34)
MCHC RBC AUTO-ENTMCNC: 32.2 G/DL (ref 31–37)
MCV RBC AUTO: 94.7 FL
MONOCYTES # BLD AUTO: 0.42 X10(3) UL (ref 0.1–1)
MONOCYTES NFR BLD AUTO: 10.7 %
NEUTROPHILS # BLD AUTO: 1.71 X10 (3) UL (ref 1.5–7.7)
NEUTROPHILS # BLD AUTO: 1.71 X10(3) UL (ref 1.5–7.7)
NEUTROPHILS NFR BLD AUTO: 43.7 %
NONHDLC SERPL-MCNC: 133 MG/DL (ref ?–130)
OSMOLALITY SERPL CALC.SUM OF ELEC: 294 MOSM/KG (ref 275–295)
PLATELET # BLD AUTO: 307 10(3)UL (ref 150–450)
POTASSIUM SERPL-SCNC: 4.5 MMOL/L (ref 3.5–5.1)
PROT SERPL-MCNC: 7.2 G/DL (ref 6.4–8.2)
RBC # BLD AUTO: 4.49 X10(6)UL
SODIUM SERPL-SCNC: 142 MMOL/L (ref 136–145)
TRIGL SERPL-MCNC: 142 MG/DL (ref 30–149)
TSI SER-ACNC: 0.54 MIU/ML (ref 0.36–3.74)
VIT D+METAB SERPL-MCNC: 29.7 NG/ML (ref 30–100)
VLDLC SERPL CALC-MCNC: 24 MG/DL (ref 0–30)
WBC # BLD AUTO: 3.9 X10(3) UL (ref 4–11)

## 2023-05-23 PROCEDURE — 84443 ASSAY THYROID STIM HORMONE: CPT

## 2023-05-23 PROCEDURE — 99396 PREV VISIT EST AGE 40-64: CPT | Performed by: FAMILY MEDICINE

## 2023-05-23 PROCEDURE — 3078F DIAST BP <80 MM HG: CPT | Performed by: FAMILY MEDICINE

## 2023-05-23 PROCEDURE — 85025 COMPLETE CBC W/AUTO DIFF WBC: CPT

## 2023-05-23 PROCEDURE — 3008F BODY MASS INDEX DOCD: CPT | Performed by: FAMILY MEDICINE

## 2023-05-23 PROCEDURE — 3074F SYST BP LT 130 MM HG: CPT | Performed by: FAMILY MEDICINE

## 2023-05-23 PROCEDURE — 82306 VITAMIN D 25 HYDROXY: CPT

## 2023-05-23 PROCEDURE — 80061 LIPID PANEL: CPT

## 2023-05-23 PROCEDURE — 80053 COMPREHEN METABOLIC PANEL: CPT

## 2023-05-23 PROCEDURE — 36415 COLL VENOUS BLD VENIPUNCTURE: CPT

## 2023-05-23 NOTE — PATIENT INSTRUCTIONS
Make a nurse visit in the future for your Tdap injection and your shingles shot. The shingles shot is 2 shots, 2 months apart.

## 2023-06-14 ENCOUNTER — HOSPITAL ENCOUNTER (OUTPATIENT)
Age: 63
Discharge: HOME | End: 2023-06-14
Payer: COMMERCIAL

## 2023-06-14 VITALS
RESPIRATION RATE: 18 BRPM | DIASTOLIC BLOOD PRESSURE: 63 MMHG | HEART RATE: 75 BPM | SYSTOLIC BLOOD PRESSURE: 148 MMHG | OXYGEN SATURATION: 99 % | TEMPERATURE: 98 F

## 2023-06-14 DIAGNOSIS — R03.0 ELEVATED BLOOD PRESSURE READING: ICD-10-CM

## 2023-06-14 DIAGNOSIS — M54.41 ACUTE RIGHT-SIDED LOW BACK PAIN WITH RIGHT-SIDED SCIATICA: ICD-10-CM

## 2023-06-14 DIAGNOSIS — S33.5XXA LUMBAR SPRAIN, INITIAL ENCOUNTER: Primary | ICD-10-CM

## 2023-06-14 PROCEDURE — 99213 OFFICE O/P EST LOW 20 MIN: CPT | Performed by: PHYSICIAN ASSISTANT

## 2023-06-14 RX ORDER — CYCLOBENZAPRINE HCL 10 MG
10 TABLET ORAL 3 TIMES DAILY PRN
Qty: 14 TABLET | Refills: 0 | Status: SHIPPED | OUTPATIENT
Start: 2023-06-14 | End: 2023-06-21

## 2023-06-14 RX ORDER — KETOROLAC TROMETHAMINE 30 MG/ML
30 INJECTION, SOLUTION INTRAMUSCULAR; INTRAVENOUS ONCE
Status: DISCONTINUED | OUTPATIENT
Start: 2023-06-14 | End: 2023-06-14

## 2023-06-14 RX ORDER — IBUPROFEN 600 MG/1
600 TABLET ORAL ONCE
Status: COMPLETED | OUTPATIENT
Start: 2023-06-14 | End: 2023-06-14

## 2023-06-14 RX ORDER — METHYLPREDNISOLONE 4 MG/1
TABLET ORAL
Qty: 21 TABLET | Refills: 0 | Status: SHIPPED | OUTPATIENT
Start: 2023-06-14

## 2023-06-14 RX ORDER — TRAMADOL HYDROCHLORIDE 50 MG/1
50 TABLET ORAL EVERY 6 HOURS PRN
Qty: 12 TABLET | Refills: 0 | Status: SHIPPED | OUTPATIENT
Start: 2023-06-14

## 2023-06-14 RX ORDER — LIDOCAINE 50 MG/G
1 PATCH TOPICAL EVERY 24 HOURS
Qty: 5 PATCH | Refills: 0 | Status: SHIPPED | OUTPATIENT
Start: 2023-06-14 | End: 2023-06-19

## 2023-06-14 RX ORDER — NAPROXEN 500 MG/1
500 TABLET ORAL 2 TIMES DAILY PRN
Qty: 10 TABLET | Refills: 0 | Status: SHIPPED | OUTPATIENT
Start: 2023-06-14 | End: 2023-06-19

## 2023-06-14 RX ORDER — CYCLOBENZAPRINE HCL 10 MG
10 TABLET ORAL ONCE
Status: COMPLETED | OUTPATIENT
Start: 2023-06-14 | End: 2023-06-14

## 2023-06-14 NOTE — ED INITIAL ASSESSMENT (HPI)
Pt with low back pain that started PTA. Pt stated she bent over when pain started. Pain radiates down anterior R leg. Rated pain 10/10.

## 2023-07-10 ENCOUNTER — TELEPHONE (OUTPATIENT)
Dept: FAMILY MEDICINE CLINIC | Facility: CLINIC | Age: 63
End: 2023-07-10

## 2023-07-10 NOTE — TELEPHONE ENCOUNTER
Patient (name and  verified) c/o continued right knee pain with swelling for the past 2 weeks  Last OV 23  Assisted patient with appt scheduling, verbalized understanding and agrees to plan. Future Appointments   Date Time Provider Alex Christianson   2023  2:15 PM Francisco Pham DO [facility] ADO     Xray completed  Notes from OV    Assessment/Plan:       Diagnoses and all orders for this visit:     Acute pain of right knee  -     XR KNEE, COMPLETE (4 OR MORE VIEWS), RIGHT (IOA=[id number]); Future  X-ray of the right knee. She can take Advil as needed as she states the pain is not all the time. Elevated blood pressure reading  She will return for physical in about 1 week and we will recheck blood pressure. I talked about this being a silent disease. Anxiety  -     ALPRAZolam 0.25 MG Oral Tab; Take 1 tablet (0.25 mg total) by mouth nightly as needed for Sleep or Anxiety. (Inform patient when ready.)  Alprazolam that she only takes periodically. Mixed hyperlipidemia  -     atorvastatin 20 MG Oral Tab; TAKE 1 TABLET BY MOUTH EVERY NIGHT FOR CHOLESTEROL  She is compliant with her atorvastatin. Referrals (if applicable)  No orders of the defined types were placed in this encounter. Follow up if symptoms persist.  Take medicine (if given) as prescribed. Approach to treatment discussed and patient/family member understands and agrees to plan. No follow-ups on file. There are no Patient Instructions on file for this visit. Xray results  Narrative   PROCEDURE: XR KNEE, COMPLETE (4 OR MORE VIEWS), RIGHT (YLN=[id number])     COMPARISON: [facility], X KNEE MN 3 VIEW RT, 1/15/2016, 2:21 PM.     INDICATIONS: Pain on medial aspect of right knee for 1 month. TECHNIQUE: Four views were obtained weightbearing. FINDINGS:  BONES: Normal. No significant arthropathy, fracture, or acute abnormality. The joint spaces are well-maintained in the upright position.   SOFT TISSUES: Negative. No visible soft tissue swelling. EFFUSION: Small joint effusion noted. OTHER: Negative. Impression   CONCLUSION: Small joint effusion. Dictated by (CST): Eze Shine MD on 5/16/2023 at 1:22 PM      Finalized by (CST):  Eze Shine MD on 5/16/2023 at 1:23 PM

## 2023-07-11 ENCOUNTER — OFFICE VISIT (OUTPATIENT)
Dept: FAMILY MEDICINE CLINIC | Facility: CLINIC | Age: 63
End: 2023-07-11

## 2023-07-11 VITALS
HEIGHT: 62.5 IN | HEART RATE: 71 BPM | BODY MASS INDEX: 25.48 KG/M2 | WEIGHT: 142 LBS | SYSTOLIC BLOOD PRESSURE: 140 MMHG | DIASTOLIC BLOOD PRESSURE: 71 MMHG | TEMPERATURE: 97 F

## 2023-07-11 DIAGNOSIS — M22.2X1 PATELLOFEMORAL PAIN SYNDROME OF RIGHT KNEE: ICD-10-CM

## 2023-07-11 DIAGNOSIS — R03.0 ELEVATED BLOOD PRESSURE READING: ICD-10-CM

## 2023-07-11 DIAGNOSIS — M25.561 CHRONIC PAIN OF RIGHT KNEE: Primary | ICD-10-CM

## 2023-07-11 DIAGNOSIS — G89.29 CHRONIC PAIN OF RIGHT KNEE: Primary | ICD-10-CM

## 2023-07-11 PROCEDURE — 3077F SYST BP >= 140 MM HG: CPT | Performed by: FAMILY MEDICINE

## 2023-07-11 PROCEDURE — 3008F BODY MASS INDEX DOCD: CPT | Performed by: FAMILY MEDICINE

## 2023-07-11 PROCEDURE — 3078F DIAST BP <80 MM HG: CPT | Performed by: FAMILY MEDICINE

## 2023-07-11 PROCEDURE — 99214 OFFICE O/P EST MOD 30 MIN: CPT | Performed by: FAMILY MEDICINE

## 2023-07-11 NOTE — PATIENT INSTRUCTIONS
Start checking your home blood pressures. After you get about 7 to 8 days worth of numbers send them to me through [address]

## 2023-07-24 ENCOUNTER — OFFICE VISIT (OUTPATIENT)
Dept: INTERNAL MEDICINE CLINIC | Facility: CLINIC | Age: 63
End: 2023-07-24

## 2023-07-24 ENCOUNTER — TELEPHONE (OUTPATIENT)
Dept: FAMILY MEDICINE CLINIC | Facility: CLINIC | Age: 63
End: 2023-07-24

## 2023-07-24 ENCOUNTER — NURSE TRIAGE (OUTPATIENT)
Dept: FAMILY MEDICINE CLINIC | Facility: CLINIC | Age: 63
End: 2023-07-24

## 2023-07-24 VITALS
HEIGHT: 62.5 IN | HEART RATE: 57 BPM | WEIGHT: 140 LBS | DIASTOLIC BLOOD PRESSURE: 73 MMHG | BODY MASS INDEX: 25.12 KG/M2 | SYSTOLIC BLOOD PRESSURE: 128 MMHG

## 2023-07-24 DIAGNOSIS — H60.391 INFECTIVE OTITIS EXTERNA OF RIGHT EAR: ICD-10-CM

## 2023-07-24 DIAGNOSIS — H61.21 IMPACTED CERUMEN OF RIGHT EAR: Primary | ICD-10-CM

## 2023-07-24 NOTE — TELEPHONE ENCOUNTER
Action Requested: Summary for Provider     []  Critical Lab, Recommendations Needed  [] Need Additional Advice  []   FYI    []   Need Orders  [] Need Medications Sent to Pharmacy  []  Other     SUMMARY: See within 3 days per protocol=appointment scheduled today, home care provided,  advised walk in clinic, urgent care or immediate care  for worsening symptoms . Future Appointments   Date Time Provider Alex Christianson   7/24/2023 12:40 PM Randal Carcamo, CHRISTOPHER [facility] [place]         Reason for call: Ear Pain  Onset: Data Unavailable        Right ear pain since Friday night, headache, stuffy head, little bit runny nose, Saturday full blown symptoms, heavy and clogged, muffled, pain not bad,mostly feeling  irritating,has been taking tylenol and prescription ear drop (not hers ).          Reason for Disposition   Ear congestion present > 48 hours    Protocols used: Ear - Congestion-A-[place]

## 2023-07-24 NOTE — TELEPHONE ENCOUNTER
Millie 7813 only     patient called back. Says she is doing okay. Just got home  Picked up prescription. RN reviewed instructions with her. She verbalizes understanding of all information, and agreeable to plan. ASSESSMENT AND PLAN:   1. Impacted cerumen of right ear  -Pt felt lightheaded initially and nauseous. Sx resolved after 5minutes with cool compress and hydration. Able to ambulate without any lightheadedness. Monitored for 10 minutes. -Reviewed concerning s/s.   - REMOVAL IMPACTED CERUMEN USING IRRIGATION/LAVAGE, UNILATERAL     2. Infective otitis externa of right ear  -Start otic drops. Reviewed use. - neomycin-polymyxin-hydrocortisone [id number] Otic Solution; Place 4 drops into the right ear 4 (four) times daily for 7 days. Dispense: 10 mL; Refill: 0     ADENDUM: called patient at 2:45pm to f/u on how she is doing. No answer, LVM, no details provided. The patient indicates understanding of these issues and agrees to the plan. The patient is asked to return in PRN.

## 2023-08-08 NOTE — TELEPHONE ENCOUNTER
Diclofenac Sodium 75 MG Oral Tab EC, Take 1 tablet (75 mg total) by mouth 2 (two) times daily. Take with meals. (for pain/inflammation). , Disp: 42 tablet, Rfl: 0
Patient called and would like her refill send today.  She left her prescription in [place] and has been out of it
normal sinus rhythm, Normal axis, Normal TX interval and QRS complex. There are no acute ischemic ST or T-wave changes.

## 2023-10-10 ENCOUNTER — LAB ENCOUNTER (OUTPATIENT)
Dept: LAB | Age: 63
End: 2023-10-10
Attending: SPECIALIST
Payer: COMMERCIAL

## 2023-10-10 ENCOUNTER — EKG ENCOUNTER (OUTPATIENT)
Dept: LAB | Age: 63
End: 2023-10-10
Attending: SPECIALIST
Payer: COMMERCIAL

## 2023-10-10 DIAGNOSIS — Z01.818 PREOPERATIVE EXAMINATION, UNSPECIFIED: ICD-10-CM

## 2023-10-10 DIAGNOSIS — Z01.818 PREOPERATIVE EXAMINATION, UNSPECIFIED: Primary | ICD-10-CM

## 2023-10-10 LAB
ALBUMIN SERPL-MCNC: 4.1 G/DL (ref 3.4–5)
ALBUMIN/GLOB SERPL: 1.2 {RATIO} (ref 1–2)
ALP LIVER SERPL-CCNC: 61 U/L
ALT SERPL-CCNC: 25 U/L
ANION GAP SERPL CALC-SCNC: 7 MMOL/L (ref 0–18)
AST SERPL-CCNC: 12 U/L (ref 15–37)
ATRIAL RATE: 71 BPM
BASOPHILS # BLD AUTO: 0.04 X10(3) UL (ref 0–0.2)
BASOPHILS NFR BLD AUTO: 1 %
BILIRUB SERPL-MCNC: 0.7 MG/DL (ref 0.1–2)
BUN BLD-MCNC: 15 MG/DL (ref 7–18)
BUN/CREAT SERPL: 21.1 (ref 10–20)
CALCIUM BLD-MCNC: 9.9 MG/DL (ref 8.5–10.1)
CHLORIDE SERPL-SCNC: 108 MMOL/L (ref 98–112)
CO2 SERPL-SCNC: 27 MMOL/L (ref 21–32)
CREAT BLD-MCNC: 0.71 MG/DL
DEPRECATED RDW RBC AUTO: 46.6 FL (ref 35.1–46.3)
EGFRCR SERPLBLD CKD-EPI 2021: 95 ML/MIN/1.73M2 (ref 60–?)
EOSINOPHIL # BLD AUTO: 0.17 X10(3) UL (ref 0–0.7)
EOSINOPHIL NFR BLD AUTO: 4.3 %
ERYTHROCYTE [DISTWIDTH] IN BLOOD BY AUTOMATED COUNT: 13.2 % (ref 11–15)
FASTING STATUS PATIENT QL REPORTED: YES
GLOBULIN PLAS-MCNC: 3.3 G/DL (ref 2.8–4.4)
GLUCOSE BLD-MCNC: 104 MG/DL (ref 70–99)
HCT VFR BLD AUTO: 43 %
HGB BLD-MCNC: 14.1 G/DL
IMM GRANULOCYTES # BLD AUTO: 0 X10(3) UL (ref 0–1)
IMM GRANULOCYTES NFR BLD: 0 %
LYMPHOCYTES # BLD AUTO: 1.53 X10(3) UL (ref 1–4)
LYMPHOCYTES NFR BLD AUTO: 38.5 %
MCH RBC QN AUTO: 31.1 PG (ref 26–34)
MCHC RBC AUTO-ENTMCNC: 32.8 G/DL (ref 31–37)
MCV RBC AUTO: 94.9 FL
MONOCYTES # BLD AUTO: 0.4 X10(3) UL (ref 0.1–1)
MONOCYTES NFR BLD AUTO: 10.1 %
NEUTROPHILS # BLD AUTO: 1.83 X10 (3) UL (ref 1.5–7.7)
NEUTROPHILS # BLD AUTO: 1.83 X10(3) UL (ref 1.5–7.7)
NEUTROPHILS NFR BLD AUTO: 46.1 %
OSMOLALITY SERPL CALC.SUM OF ELEC: 295 MOSM/KG (ref 275–295)
P AXIS: 80 DEGREES
P-R INTERVAL: 142 MS
PLATELET # BLD AUTO: 316 10(3)UL (ref 150–450)
POTASSIUM SERPL-SCNC: 3.9 MMOL/L (ref 3.5–5.1)
PROT SERPL-MCNC: 7.4 G/DL (ref 6.4–8.2)
Q-T INTERVAL: 410 MS
QRS DURATION: 78 MS
QTC CALCULATION (BEZET): 445 MS
R AXIS: 29 DEGREES
RBC # BLD AUTO: 4.53 X10(6)UL
SODIUM SERPL-SCNC: 142 MMOL/L (ref 136–145)
T AXIS: 58 DEGREES
VENTRICULAR RATE: 71 BPM
WBC # BLD AUTO: 4 X10(3) UL (ref 4–11)

## 2023-10-10 PROCEDURE — 36415 COLL VENOUS BLD VENIPUNCTURE: CPT

## 2023-10-10 PROCEDURE — 93010 ELECTROCARDIOGRAM REPORT: CPT | Performed by: INTERNAL MEDICINE

## 2023-10-10 PROCEDURE — 93005 ELECTROCARDIOGRAM TRACING: CPT

## 2023-10-10 PROCEDURE — 85025 COMPLETE CBC W/AUTO DIFF WBC: CPT

## 2023-10-10 PROCEDURE — 80053 COMPREHEN METABOLIC PANEL: CPT

## 2023-11-02 ENCOUNTER — TELEPHONE (OUTPATIENT)
Dept: FAMILY MEDICINE CLINIC | Facility: CLINIC | Age: 63
End: 2023-11-02

## 2023-11-02 NOTE — TELEPHONE ENCOUNTER
Spoke to patient and advised her to schedule an appointment. She will have the results in about a week.      Future Appointments   Date Time Provider Alex Christianson   11/9/2023  1:30 PM Francisco Pham DO [facility] ADO

## 2023-11-02 NOTE — TELEPHONE ENCOUNTER
Patient is calling to advise PCP she had a finding  from a lifetime screening ultrasound with a finding of a  nodule on her lymph node/thyroid and would like to discuss with PCP what she should do next. Patient mentions she is a cancer survivor Please advise.

## 2023-11-06 ENCOUNTER — TELEPHONE (OUTPATIENT)
Dept: FAMILY MEDICINE CLINIC | Facility: CLINIC | Age: 63
End: 2023-11-06

## 2023-11-06 DIAGNOSIS — E04.1 THYROID NODULE: Primary | ICD-10-CM

## 2023-11-06 NOTE — TELEPHONE ENCOUNTER
Left detailed message  ( on CARMELLA ) that patient can see Dr Chandler Hawley     Provided information for  Dr. Chandler Hawley  at [phone]    Advised to call back with any questions/ concerns

## 2023-11-06 NOTE — TELEPHONE ENCOUNTER
Pt states she had ultrasound thyroid done from lifetime screening. Findings noted a nodule on thyroid. Pt has history of cancer. Pt scheduled with Dr. Mccarthy on 11/21. Pt states she is unable to obtain a copy of results until then. Pt asking for Dr. Maynor Velasco recommendation for a specific ENT provider. I offered ENT group number. Pt would like to know which doctor Dr. Mccarthy recommends. Pt has PPO.

## 2023-11-06 NOTE — TELEPHONE ENCOUNTER
Pt states she will not be able to obtain results by appointment 11/9/23. Pt rescheduled for 11/21. Pt requesting for ENT recommendations in the meantime. See referral encounter.   Future Appointments   Date Time Provider Alex Christianson   11/21/2023 10:45 AM Tarsha Ceron DO [facility] ADO

## 2023-11-08 ENCOUNTER — TELEPHONE (OUTPATIENT)
Dept: FAMILY MEDICINE CLINIC | Facility: CLINIC | Age: 63
End: 2023-11-08

## 2023-11-08 NOTE — TELEPHONE ENCOUNTER
Patient calling a second time (identified full name and ) while on the other line with [employer] to confirm where she can send the report of the ultrasound of thyroid too. Fax number for Antoni QUIROGA given    Advised patient to attach images to [employer] as they can not be emailed to us directly.     Patient verbalized understanding and states she will try to upload via [employer] and will call us back

## 2023-11-08 NOTE — TELEPHONE ENCOUNTER
Patient calling (identified full name and ) to schedule a sooner appointment with Yolanda Lazrao to discuss thyroid ultrasound report she received yesterday after having it completed through a [employer] [facility] screening service    -refer to TE 23 (see below    Patient states per email received, it stated the results were emailed to the provider (report not noted to be in patient's chart as of now)      Advised patient to keep ENT appointment for 23 and to re-schedule appointment wit Dr. Jimmy Bradford after seeing ENT to go over results and that visit    Informed patient to bring a copy (paper) of the results to both appointments. Patient stated will also try to send final report via [employer]    Patient verbalizes understanding and agrees to the plan of care. 23 11:37 AM  Note  Pt states she will not be able to obtain results by appointment 23. Pt rescheduled for . Pt requesting for ENT recommendations in the meantime. See referral encounter.          Future Appointments   Date Time Provider Alex Christianson   2023 10:45 AM Sp Asencio DO [facility] ADO           Future Appointments   Date Time Provider Alex Christianson   2023 11:10 AM Black Sims MD [address] [facility]   2023  2:45 PM Francisco Pham DO [facility] ADO

## 2023-11-16 ENCOUNTER — TELEPHONE (OUTPATIENT)
Dept: FAMILY MEDICINE CLINIC | Facility: CLINIC | Age: 63
End: 2023-11-16

## 2023-11-16 RX ORDER — CIPROFLOXACIN 500 MG/1
500 TABLET, FILM COATED ORAL 2 TIMES DAILY
Qty: 6 TABLET | Refills: 0 | Status: SHIPPED | OUTPATIENT
Start: 2023-11-16 | End: 2023-11-19

## 2023-11-16 NOTE — TELEPHONE ENCOUNTER
Spoke with pt,  verified pt is returning our call. Pt was informed of MD recommendation, pt stated understanding.         Future Appointments   Date Time Provider Alex Meghan   2023 11:10 AM Genevieve Earl MD [address] [facility] THE [facility]   2023  2:45 PM [facility]

## 2023-11-16 NOTE — TELEPHONE ENCOUNTER
I sent the medication to the [address]. Make sure she keeps her appointment at the end of this month.

## 2023-11-16 NOTE — TELEPHONE ENCOUNTER
Patient calling (identified name and )  states she is currently in [place]. Reports symptoms of urinary frequency, urgency, pain and burning with urination. States urine was very yellow, urine has no foul odor; onset of . Taking AZO OTC without improvement. Drinking a lot of  water. Denies fever, back pain or any other symptoms. Asking if an antibiotic can be prescribed since she is out of town. States she has had UTI's in the past and this feels the same. Please advise.      Preferred Pharmacy:  [facility] [address] [place] [address] - [address] [facility] AT [address] [place] [address], [phone], [phone]

## 2023-11-21 ENCOUNTER — OFFICE VISIT (OUTPATIENT)
Dept: OTOLARYNGOLOGY | Facility: CLINIC | Age: 63
End: 2023-11-21
Payer: COMMERCIAL

## 2023-11-21 VITALS — BODY MASS INDEX: 25.12 KG/M2 | HEIGHT: 62.5 IN | WEIGHT: 140 LBS

## 2023-11-21 DIAGNOSIS — R13.10 DYSPHAGIA, UNSPECIFIED TYPE: Primary | ICD-10-CM

## 2023-11-21 DIAGNOSIS — E04.1 THYROID NODULE: ICD-10-CM

## 2023-11-21 PROCEDURE — 3008F BODY MASS INDEX DOCD: CPT | Performed by: OTOLARYNGOLOGY

## 2023-11-21 PROCEDURE — 99214 OFFICE O/P EST MOD 30 MIN: CPT | Performed by: OTOLARYNGOLOGY

## 2023-11-27 ENCOUNTER — HOSPITAL ENCOUNTER (OUTPATIENT)
Dept: GENERAL RADIOLOGY | Facility: HOSPITAL | Age: 63
End: 2023-11-27
Attending: OTOLARYNGOLOGY
Payer: COMMERCIAL

## 2023-11-27 ENCOUNTER — OFFICE VISIT (OUTPATIENT)
Dept: FAMILY MEDICINE CLINIC | Facility: CLINIC | Age: 63
End: 2023-11-27
Payer: COMMERCIAL

## 2023-11-27 ENCOUNTER — LAB ENCOUNTER (OUTPATIENT)
Dept: LAB | Age: 63
End: 2023-11-27
Attending: OTOLARYNGOLOGY
Payer: COMMERCIAL

## 2023-11-27 VITALS
TEMPERATURE: 97 F | HEART RATE: 76 BPM | BODY MASS INDEX: 24.65 KG/M2 | SYSTOLIC BLOOD PRESSURE: 140 MMHG | DIASTOLIC BLOOD PRESSURE: 60 MMHG | WEIGHT: 137.38 LBS | HEIGHT: 62.5 IN

## 2023-11-27 DIAGNOSIS — I10 ESSENTIAL HYPERTENSION: ICD-10-CM

## 2023-11-27 DIAGNOSIS — R73.9 HYPERGLYCEMIA: ICD-10-CM

## 2023-11-27 DIAGNOSIS — E04.1 THYROID NODULE: Primary | ICD-10-CM

## 2023-11-27 DIAGNOSIS — E78.2 MIXED HYPERLIPIDEMIA: ICD-10-CM

## 2023-11-27 DIAGNOSIS — R13.10 DYSPHAGIA, UNSPECIFIED TYPE: ICD-10-CM

## 2023-11-27 DIAGNOSIS — R35.0 URINARY FREQUENCY: ICD-10-CM

## 2023-11-27 DIAGNOSIS — R30.0 DYSURIA: ICD-10-CM

## 2023-11-27 DIAGNOSIS — F41.9 ANXIETY: ICD-10-CM

## 2023-11-27 LAB
BILIRUB UR QL: NEGATIVE
CLARITY UR: CLEAR
COLOR UR: YELLOW
GLUCOSE UR-MCNC: 200 MG/DL
HGB UR QL STRIP.AUTO: NEGATIVE
KETONES UR-MCNC: NEGATIVE MG/DL
LEUKOCYTE ESTERASE UR QL STRIP.AUTO: NEGATIVE
NITRITE UR QL STRIP.AUTO: NEGATIVE
PH UR: 5.5 [PH] (ref 5–8)
SP GR UR STRIP: 1.03 (ref 1–1.03)
UROBILINOGEN UR STRIP-ACNC: NORMAL

## 2023-11-27 PROCEDURE — 81003 URINALYSIS AUTO W/O SCOPE: CPT

## 2023-11-27 PROCEDURE — 3077F SYST BP >= 140 MM HG: CPT | Performed by: FAMILY MEDICINE

## 2023-11-27 PROCEDURE — 99215 OFFICE O/P EST HI 40 MIN: CPT | Performed by: FAMILY MEDICINE

## 2023-11-27 PROCEDURE — 3078F DIAST BP <80 MM HG: CPT | Performed by: FAMILY MEDICINE

## 2023-11-27 PROCEDURE — 3008F BODY MASS INDEX DOCD: CPT | Performed by: FAMILY MEDICINE

## 2023-11-27 RX ORDER — AMLODIPINE BESYLATE 5 MG/1
5 TABLET ORAL DAILY
Qty: 90 TABLET | Refills: 1 | Status: SHIPPED | OUTPATIENT
Start: 2023-11-27 | End: 2024-11-21

## 2023-11-27 RX ORDER — ATORVASTATIN CALCIUM 20 MG/1
TABLET, FILM COATED ORAL
Qty: 90 TABLET | Refills: 2 | Status: SHIPPED | OUTPATIENT
Start: 2023-11-27

## 2023-11-27 RX ORDER — ALPRAZOLAM 0.25 MG/1
0.25 TABLET ORAL NIGHTLY PRN
Qty: 30 TABLET | Refills: 0 | Status: SHIPPED | OUTPATIENT
Start: 2023-11-27

## 2023-11-27 NOTE — PATIENT INSTRUCTIONS
After December 20, 2023 take about 10 days worth of blood pressure numbers and then send them to me through [employer] so we can see how the Norvasc is working.

## 2023-11-28 ENCOUNTER — HOSPITAL ENCOUNTER (OUTPATIENT)
Dept: GENERAL RADIOLOGY | Facility: HOSPITAL | Age: 63
Discharge: HOME | End: 2023-11-28
Attending: OTOLARYNGOLOGY
Payer: COMMERCIAL

## 2023-11-28 DIAGNOSIS — R13.10 DYSPHAGIA, UNSPECIFIED TYPE: ICD-10-CM

## 2023-11-28 PROCEDURE — 74246 X-RAY XM UPR GI TRC 2CNTRST: CPT | Performed by: OTOLARYNGOLOGY

## 2023-11-29 ENCOUNTER — LAB ENCOUNTER (OUTPATIENT)
Dept: LAB | Age: 63
End: 2023-11-29
Attending: FAMILY MEDICINE
Payer: COMMERCIAL

## 2023-11-29 DIAGNOSIS — R73.9 HYPERGLYCEMIA: ICD-10-CM

## 2023-11-29 LAB
EST. AVERAGE GLUCOSE BLD GHB EST-MCNC: 123 MG/DL (ref 68–126)
HBA1C MFR BLD: 5.9 % (ref ?–5.7)

## 2023-11-29 PROCEDURE — 36415 COLL VENOUS BLD VENIPUNCTURE: CPT

## 2023-11-29 PROCEDURE — 83036 HEMOGLOBIN GLYCOSYLATED A1C: CPT

## 2023-12-01 ENCOUNTER — HOSPITAL ENCOUNTER (OUTPATIENT)
Dept: ULTRASOUND IMAGING | Age: 63
Discharge: HOME | End: 2023-12-01
Attending: OTOLARYNGOLOGY
Payer: COMMERCIAL

## 2023-12-01 DIAGNOSIS — E04.1 THYROID NODULE: ICD-10-CM

## 2023-12-01 PROCEDURE — 76536 US EXAM OF HEAD AND NECK: CPT | Performed by: OTOLARYNGOLOGY

## 2023-12-04 ENCOUNTER — TELEPHONE (OUTPATIENT)
Dept: OTOLARYNGOLOGY | Facility: CLINIC | Age: 63
End: 2023-12-04

## 2023-12-12 ENCOUNTER — APPOINTMENT (OUTPATIENT)
Dept: CT IMAGING | Facility: HOSPITAL | Age: 63
End: 2023-12-12
Payer: COMMERCIAL

## 2023-12-12 ENCOUNTER — OFFICE VISIT (OUTPATIENT)
Dept: OTOLARYNGOLOGY | Facility: CLINIC | Age: 63
End: 2023-12-12

## 2023-12-12 ENCOUNTER — APPOINTMENT (OUTPATIENT)
Dept: GENERAL RADIOLOGY | Facility: HOSPITAL | Age: 63
End: 2023-12-12
Attending: EMERGENCY MEDICINE
Payer: COMMERCIAL

## 2023-12-12 ENCOUNTER — APPOINTMENT (OUTPATIENT)
Dept: MRI IMAGING | Facility: HOSPITAL | Age: 63
End: 2023-12-12
Attending: EMERGENCY MEDICINE
Payer: COMMERCIAL

## 2023-12-12 ENCOUNTER — HOSPITAL ENCOUNTER (EMERGENCY)
Facility: HOSPITAL | Age: 63
Discharge: HOME | End: 2023-12-12
Attending: EMERGENCY MEDICINE
Payer: COMMERCIAL

## 2023-12-12 VITALS
TEMPERATURE: 98 F | OXYGEN SATURATION: 97 % | WEIGHT: 137.81 LBS | RESPIRATION RATE: 13 BRPM | HEART RATE: 65 BPM | DIASTOLIC BLOOD PRESSURE: 70 MMHG | BODY MASS INDEX: 25 KG/M2 | SYSTOLIC BLOOD PRESSURE: 140 MMHG

## 2023-12-12 VITALS — WEIGHT: 137 LBS | BODY MASS INDEX: 25.21 KG/M2 | HEIGHT: 62 IN

## 2023-12-12 DIAGNOSIS — E04.1 THYROID NODULE: Primary | ICD-10-CM

## 2023-12-12 DIAGNOSIS — R13.10 DYSPHAGIA, UNSPECIFIED TYPE: ICD-10-CM

## 2023-12-12 DIAGNOSIS — R20.0 HAND NUMBNESS: ICD-10-CM

## 2023-12-12 DIAGNOSIS — I67.1 INTRACRANIAL ANEURYSM: Primary | ICD-10-CM

## 2023-12-12 PROBLEM — G56.01 RIGHT CARPAL TUNNEL SYNDROME: Status: ACTIVE | Noted: 2023-12-12

## 2023-12-12 PROBLEM — L03.119 CELLULITIS, UPPER ARM: Status: ACTIVE | Noted: 2023-12-12

## 2023-12-12 PROBLEM — R20.2 NUMBNESS AND TINGLING IN RIGHT HAND: Status: ACTIVE | Noted: 2023-12-12

## 2023-12-12 PROBLEM — L25.9 CONTACT DERMATITIS: Status: ACTIVE | Noted: 2023-12-12

## 2023-12-12 LAB
ALBUMIN SERPL-MCNC: 4.9 G/DL (ref 3.2–4.8)
ALBUMIN/GLOB SERPL: 1.6 {RATIO} (ref 1–2)
ALP LIVER SERPL-CCNC: 69 U/L
ALT SERPL-CCNC: 28 U/L
ANION GAP SERPL CALC-SCNC: 7 MMOL/L (ref 0–18)
APTT PPP: 24.2 SECONDS (ref 23.3–35.6)
AST SERPL-CCNC: 24 U/L (ref ?–34)
ATRIAL RATE: 68 BPM
BASOPHILS # BLD AUTO: 0.03 X10(3) UL (ref 0–0.2)
BASOPHILS NFR BLD AUTO: 0.6 %
BILIRUB SERPL-MCNC: 0.6 MG/DL (ref 0.2–1.1)
BUN BLD-MCNC: 10 MG/DL (ref 9–23)
BUN/CREAT SERPL: 14.3 (ref 10–20)
CALCIUM BLD-MCNC: 10.3 MG/DL (ref 8.7–10.4)
CHLORIDE SERPL-SCNC: 104 MMOL/L (ref 98–112)
CO2 SERPL-SCNC: 28 MMOL/L (ref 21–32)
CREAT BLD-MCNC: 0.7 MG/DL
DEPRECATED RDW RBC AUTO: 44.6 FL (ref 35.1–46.3)
EGFRCR SERPLBLD CKD-EPI 2021: 97 ML/MIN/1.73M2 (ref 60–?)
EOSINOPHIL # BLD AUTO: 0.12 X10(3) UL (ref 0–0.7)
EOSINOPHIL NFR BLD AUTO: 2.4 %
ERYTHROCYTE [DISTWIDTH] IN BLOOD BY AUTOMATED COUNT: 13.2 % (ref 11–15)
ETHANOL SERPL-MCNC: <3 MG/DL (ref ?–3)
GLOBULIN PLAS-MCNC: 3 G/DL (ref 2.8–4.4)
GLUCOSE BLD-MCNC: 102 MG/DL (ref 70–99)
HCT VFR BLD AUTO: 41.4 %
HGB BLD-MCNC: 13.7 G/DL
IMM GRANULOCYTES # BLD AUTO: 0.01 X10(3) UL (ref 0–1)
IMM GRANULOCYTES NFR BLD: 0.2 %
INR BLD: 0.86 (ref 0.8–1.2)
LYMPHOCYTES # BLD AUTO: 2.18 X10(3) UL (ref 1–4)
LYMPHOCYTES NFR BLD AUTO: 43.1 %
MCH RBC QN AUTO: 30.4 PG (ref 26–34)
MCHC RBC AUTO-ENTMCNC: 33.1 G/DL (ref 31–37)
MCV RBC AUTO: 92 FL
MONOCYTES # BLD AUTO: 0.59 X10(3) UL (ref 0.1–1)
MONOCYTES NFR BLD AUTO: 11.7 %
NEUTROPHILS # BLD AUTO: 2.13 X10 (3) UL (ref 1.5–7.7)
NEUTROPHILS # BLD AUTO: 2.13 X10(3) UL (ref 1.5–7.7)
NEUTROPHILS NFR BLD AUTO: 42 %
OSMOLALITY SERPL CALC.SUM OF ELEC: 287 MOSM/KG (ref 275–295)
P AXIS: 78 DEGREES
P-R INTERVAL: 154 MS
PLATELET # BLD AUTO: 323 10(3)UL (ref 150–450)
POTASSIUM SERPL-SCNC: 4.2 MMOL/L (ref 3.5–5.1)
PROT SERPL-MCNC: 7.9 G/DL (ref 5.7–8.2)
PROTHROMBIN TIME: 12.3 SECONDS (ref 11.6–14.8)
Q-T INTERVAL: 418 MS
QRS DURATION: 78 MS
QTC CALCULATION (BEZET): 444 MS
R AXIS: 55 DEGREES
RBC # BLD AUTO: 4.5 X10(6)UL
SODIUM SERPL-SCNC: 139 MMOL/L (ref 136–145)
T AXIS: 62 DEGREES
TROPONIN I SERPL HS-MCNC: <3 NG/L
VENTRICULAR RATE: 68 BPM
WBC # BLD AUTO: 5.1 X10(3) UL (ref 4–11)

## 2023-12-12 PROCEDURE — 71046 X-RAY EXAM CHEST 2 VIEWS: CPT | Performed by: EMERGENCY MEDICINE

## 2023-12-12 PROCEDURE — 70498 CT ANGIOGRAPHY NECK: CPT | Performed by: EMERGENCY MEDICINE

## 2023-12-12 PROCEDURE — 99214 OFFICE O/P EST MOD 30 MIN: CPT | Performed by: OTOLARYNGOLOGY

## 2023-12-12 PROCEDURE — 70551 MRI BRAIN STEM W/O DYE: CPT | Performed by: EMERGENCY MEDICINE

## 2023-12-12 PROCEDURE — 70450 CT HEAD/BRAIN W/O DYE: CPT

## 2023-12-12 PROCEDURE — 3008F BODY MASS INDEX DOCD: CPT | Performed by: OTOLARYNGOLOGY

## 2023-12-12 PROCEDURE — 70496 CT ANGIOGRAPHY HEAD: CPT | Performed by: EMERGENCY MEDICINE

## 2023-12-12 PROCEDURE — 99204 OFFICE O/P NEW MOD 45 MIN: CPT | Performed by: OTHER

## 2023-12-12 RX ORDER — ASPIRIN 81 MG/1
243 TABLET, CHEWABLE ORAL ONCE
Status: COMPLETED | OUTPATIENT
Start: 2023-12-12 | End: 2023-12-12

## 2023-12-12 RX ORDER — ALPRAZOLAM 0.5 MG/1
0.5 TABLET ORAL ONCE
Status: COMPLETED | OUTPATIENT
Start: 2023-12-12 | End: 2023-12-12

## 2023-12-12 NOTE — DISCHARGE INSTRUCTIONS
Return to emergency department for changes in mentation, weakness, worsening numbness, losing stool/urine on yourself. Please follow with your primary care provider in the next few days for reevaluation. Neurology has evaluated you and suspects that you have carpal tunnel. The Emergency Department is not intended to be a substitute for an effort to provide complete medical care. The imaging, if any, have often been interpreted on a preliminary basis pending final reading by the radiologist. If your blood pressure was greater than 140/90, please have this blood pressure rechecked by your primary care provider mohinder the next few days. You will benefit from a further discussion of lifestyle modifications that include Weight Reduction - Dietary Sodium Restriction - Increased Physical Activity and Moderation in alcohol (ETOH) Consumption. If possible check your pressure at home and keep a blood pressure log to bring to your physician.

## 2023-12-12 NOTE — ED INITIAL ASSESSMENT (HPI)
Pt sent by MD Muniz So office. Pt c/o tingling to R hand and states she \"feels funny. \" Pt states this started at 0730 today. No HA, +81mg ASA daily  No recent falls. TIA 2019    HTN in triage, pt was rx BP meds last week, has not started yet, no blurry vision, double vision.

## 2023-12-21 ENCOUNTER — OFFICE VISIT (OUTPATIENT)
Dept: SURGERY | Facility: CLINIC | Age: 63
End: 2023-12-21
Payer: COMMERCIAL

## 2023-12-21 ENCOUNTER — TELEPHONE (OUTPATIENT)
Dept: SURGERY | Facility: CLINIC | Age: 63
End: 2023-12-21

## 2023-12-21 VITALS
BODY MASS INDEX: 23.92 KG/M2 | HEART RATE: 70 BPM | HEIGHT: 62 IN | DIASTOLIC BLOOD PRESSURE: 82 MMHG | WEIGHT: 130 LBS | SYSTOLIC BLOOD PRESSURE: 120 MMHG

## 2023-12-21 DIAGNOSIS — I67.1 BRAIN ANEURYSM: Primary | ICD-10-CM

## 2023-12-21 PROCEDURE — 3074F SYST BP LT 130 MM HG: CPT | Performed by: NEUROLOGICAL SURGERY

## 2023-12-21 PROCEDURE — 99205 OFFICE O/P NEW HI 60 MIN: CPT | Performed by: NEUROLOGICAL SURGERY

## 2023-12-21 PROCEDURE — 3079F DIAST BP 80-89 MM HG: CPT | Performed by: NEUROLOGICAL SURGERY

## 2023-12-21 PROCEDURE — 3008F BODY MASS INDEX DOCD: CPT | Performed by: NEUROLOGICAL SURGERY

## 2023-12-21 NOTE — TELEPHONE ENCOUNTER
Noted plan for patient as written by Dr. Lucero Halo:    \"Follow up in 1 year with Evens Zaidi with an MRA brain without contrast\"     Reminder placed to appear in Nursing pool in November of 2024.

## 2023-12-22 ENCOUNTER — OFFICE VISIT (OUTPATIENT)
Dept: FAMILY MEDICINE CLINIC | Facility: CLINIC | Age: 63
End: 2023-12-22
Payer: COMMERCIAL

## 2023-12-22 ENCOUNTER — HOSPITAL ENCOUNTER (OUTPATIENT)
Dept: GENERAL RADIOLOGY | Age: 63
Discharge: HOME | End: 2023-12-22
Attending: FAMILY MEDICINE
Payer: COMMERCIAL

## 2023-12-22 VITALS
HEIGHT: 62 IN | SYSTOLIC BLOOD PRESSURE: 120 MMHG | HEART RATE: 82 BPM | WEIGHT: 133.63 LBS | BODY MASS INDEX: 24.59 KG/M2 | DIASTOLIC BLOOD PRESSURE: 73 MMHG

## 2023-12-22 DIAGNOSIS — E04.1 RIGHT THYROID NODULE: ICD-10-CM

## 2023-12-22 DIAGNOSIS — I67.1 INTRACRANIAL ANEURYSM: ICD-10-CM

## 2023-12-22 DIAGNOSIS — M79.644 PAIN OF RIGHT THUMB: ICD-10-CM

## 2023-12-22 DIAGNOSIS — M79.89 SWELLING OF RIGHT THUMB: ICD-10-CM

## 2023-12-22 DIAGNOSIS — R91.8 PULMONARY NODULES/LESIONS, MULTIPLE: Primary | ICD-10-CM

## 2023-12-22 PROCEDURE — 3078F DIAST BP <80 MM HG: CPT | Performed by: FAMILY MEDICINE

## 2023-12-22 PROCEDURE — 3008F BODY MASS INDEX DOCD: CPT | Performed by: FAMILY MEDICINE

## 2023-12-22 PROCEDURE — 3074F SYST BP LT 130 MM HG: CPT | Performed by: FAMILY MEDICINE

## 2023-12-22 PROCEDURE — 73140 X-RAY EXAM OF FINGER(S): CPT | Performed by: FAMILY MEDICINE

## 2023-12-22 PROCEDURE — 99215 OFFICE O/P EST HI 40 MIN: CPT | Performed by: FAMILY MEDICINE

## 2023-12-23 DIAGNOSIS — M18.11 DEGENERATIVE ARTHRITIS OF THUMB, RIGHT: ICD-10-CM

## 2023-12-23 DIAGNOSIS — M79.644 PAIN OF RIGHT THUMB: ICD-10-CM

## 2023-12-23 DIAGNOSIS — M79.89 SWELLING OF RIGHT THUMB: Primary | ICD-10-CM

## 2024-01-12 ENCOUNTER — HOSPITAL ENCOUNTER (OUTPATIENT)
Dept: CT IMAGING | Age: 64
Discharge: HOME | End: 2024-01-12
Attending: FAMILY MEDICINE
Payer: COMMERCIAL

## 2024-01-12 DIAGNOSIS — R91.8 PULMONARY NODULES/LESIONS, MULTIPLE: ICD-10-CM

## 2024-01-12 PROCEDURE — 71250 CT THORAX DX C-: CPT | Performed by: FAMILY MEDICINE

## 2024-01-15 ENCOUNTER — TELEMEDICINE (OUTPATIENT)
Dept: FAMILY MEDICINE CLINIC | Facility: CLINIC | Age: 64
End: 2024-01-15
Payer: COMMERCIAL

## 2024-01-15 DIAGNOSIS — E04.1 RIGHT THYROID NODULE: ICD-10-CM

## 2024-01-15 DIAGNOSIS — R91.8 PULMONARY NODULES/LESIONS, MULTIPLE: ICD-10-CM

## 2024-01-15 DIAGNOSIS — R05.1 ACUTE COUGH: ICD-10-CM

## 2024-01-15 DIAGNOSIS — A31.0 ATYPICAL MYCOBACTERIAL INFECTION OF LUNG (HCC): ICD-10-CM

## 2024-01-15 DIAGNOSIS — I25.10 ATHEROSCLEROSIS OF NATIVE CORONARY ARTERY OF NATIVE HEART WITHOUT ANGINA PECTORIS: ICD-10-CM

## 2024-01-15 DIAGNOSIS — R91.8 ABNORMAL CT SCAN OF LUNG: Primary | ICD-10-CM

## 2024-01-15 PROCEDURE — 99215 OFFICE O/P EST HI 40 MIN: CPT | Performed by: FAMILY MEDICINE

## 2024-01-15 RX ORDER — AZITHROMYCIN 250 MG/1
TABLET, FILM COATED ORAL
Qty: 6 TABLET | Refills: 0 | Status: SHIPPED | OUTPATIENT
Start: 2024-01-15 | End: 2024-01-19

## 2024-01-15 NOTE — PROGRESS NOTES
VIDEO VISIT PROGRESS NOTE (Non-Covid-19)  Todays date: 1/15/2024 12:58 PM      Due to the COVID-19 emergency implementation plan, this patient's visit was converted to a video visit as agreed upon with the patient.    Epic Video Check-In    Kaitlin Schroeder verbally consents to a Video Check-In service on 01/15/24.  Patient understands and accepts financial responsibility for any deductible, co-insurance and/or co-pays associated with this service.  Patient call triage note reviewed.      I spoke to Kaitlin Schroeder (or patient's family member/partner) by video, verified date of birth, and discussed current concerns.      If patient is a child then of course the parent or guardian will be giving the verbal consent for the video check-in and of course I will be speaking to this person for this visit.  Note, many times the patient however is nearby and I can hear the patient answering questions while the person on the video with me is talking to the patient.    This also goes for family members who would rather speak to me on behalf of their Ecuadorean-speaking (or another foreign language) patient.  The patient will give consent but I will be speaking to the person that would be translating.  Also some of these patients with possible COVID-19 infection or some type of other illness are too ill to be on video and would rather have a designated person speak for them and in that case we will be speaking to that designated person and all parties involved understand the disclaimer that goes along with the consent for the video check-in service as stated above.        History of Present Illness:     Last seen by me on 12/22/2023.    CT CHEST (CPT=71250)    Result Date: 1/12/2024  CONCLUSION:  1. Distal endobronchial impaction and reticulonodular opacities throughout the right middle lobe and to a lesser degree lingula.  Findings suggest small airways infection/bronchiolitis (probably chronic/related to atypical  mycobacterial infection).  This  can be reassessed on anticipated follow-up to ensure stability-see below. 2. Scattered bilateral solid and ground-glass density nodules throughout both lungs, the largest of which is a 1.1 cm ground-glass density nodule in the right upper lobe.  The nodules at the lung apices are unchanged since prior CT arteriography of the neck from December, 2023 whereas the basilar nodules were not included in the field of view on that exam.  Follow-up chest CT in 6 months is advised to ensure stability. 3. Low-grade reticulonodular opacities in the left upper lobe likely relate to small airways infection and are unchanged since prior. 4. Left anterior descending coronary artery calcification. 5. Stable 1.5 cm low-attenuation right thyroid nodule. 6. Small retrocardiac hiatal hernia. 7. Surgical changes in the inner left breast.  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 8. Lesser incidental findings as above.   elm-remote  Dictated by (CST): Joe Bob MD on 1/12/2024 at 11:49 AM     Finalized by (CST): Joe Bob MD on 1/12/2024 at 12:02 PM             Pt completed CT chest for possible pulmonary nodules on 1/12/2024 and I reviewed the results.  We did this at she had a CTA stroke protocol done at the hospital which I reviewed with her which showed some pulmonary nodules but only cover the apices of the lungs.  I discussed the results with her. Denies coughing for months or spitting up blood.  Denies Hx valley fever and did live in [place] for some time but not in [place]. I provided a referral to a pulmonologist. I reviewed Dr. Baires's note regarding her thyroid; I discussed she has an order for US Thyroid and she is aware to do it 6 months later.     I also discussed her Left anterior descending coronary artery calcification from CT Chest. Denies Hx heart attacks. Denies CP or SOB. Pt is taking Atorvastatin at this time without any side effects. I  provided a referral to Dr. Calloway, cardiologist.  Explained this to both her and her  and why this is important.    She would like antibiotics prescribed for her URI like symptoms; symptoms began 2 days ago. Pt has rhinorrhea, mild sneezing, nasal congestion, and headache. Denies fever and chest congestion. She tested negative for Covid on 1/14/2024. Pt states her other family members have been ill with the similar symptoms. Pt has been taking OTC cold medications with some relief.     Duration of video along with documentation in minutes: 19 minutes and 48 seconds on video with an additional 18 minutes of documentation and chart review.         Wt Readings from Last 3 Encounters:   12/22/23 133 lb 9.6 oz   12/21/23 130 lb   12/12/23 137 lb       BMI Readings from Last 3 Encounters:   12/22/23 24.44 kg/m²   12/21/23 23.78 kg/m²   12/12/23 25.06 kg/m²       BP Readings from Last 3 Encounters:   12/22/23 120/73   12/21/23 120/82   12/12/23 140/70         Review of Systems   Constitutional:  Negative for fever.   HENT:  Positive for congestion, rhinorrhea and sneezing.    Respiratory:  Positive for cough. Negative for chest tightness and shortness of breath.    Cardiovascular:  Negative for chest pain.           Medical History:      Past Medical History:   Diagnosis Date    Anxiety     Cancer (HCC) 2016    breast    Cerebrovascular disease 2020    Colon polyps 2023    repeat CLN in 2026    Essential hypertension     High cholesterol     History of colposcopy     Hyperlipidemia     Lipid screening 05/29/2014    Per [facility]    Microvascular ischemia of myocardium     Multiple allergies     Per [facility]:  \"Allergies.\"    Osteoporosis screening 02/10/2011    Per [facility]    Silent cerebral infarction (HCC) 2020    Stroke (HCC)        Past Surgical History:   Procedure Laterality Date    COLONOSCOPY  08/27/2018    Dr Contreras    COLONOSCOPY N/A 4/19/2023    Procedure: COLONOSCOPY;  Surgeon: PASQUALE Contreras MD;  Location:  [facility]    OTHER SURGICAL HISTORY Left 11/2016    lumpectomy     OTHER SURGICAL HISTORY Right 11/2014    breast cyst    OTHER SURGICAL HISTORY Right 11/1988    breast cyst          Current Outpatient Medications   Medication Sig Dispense Refill    atorvastatin 20 MG Oral Tab TAKE 1 TABLET BY MOUTH EVERY NIGHT FOR CHOLESTEROL 90 tablet 2    ALPRAZolam 0.25 MG Oral Tab Take 1 tablet (0.25 mg total) by mouth nightly as needed for Sleep or Anxiety. (Inform patient when ready.) 30 tablet 0    amLODIPine 5 MG Oral Tab Take 1 tablet (5 mg total) by mouth daily. For high blood pressure. 90 tablet 1    diphenhydrAMINE HCl, Sleep, (SIMPLY SLEEP) 25 MG Oral Tab Take 1 tablet (25 mg total) by mouth nightly as needed for Sleep.      aspirin 81 MG Oral Tab EC Take 1 tablet (81 mg total) by mouth daily. 90 tablet 3     Allergies:No Known Allergies         Physical Exam:   Limited examination due to this being a video visit       Patient was speaking in complete sentences, no increased work of breathing and very coherent and alert on the phone.  Alert and oriented x 3. Patient appears well-developed and well-nourished. No distress.  Patient was responding to questions appropriately.  Patient did not sound short of breath.  Patient has a normal mood and affect.  Sounded nasally congested.  No swelling of the face.  She was having a dry cough intermittently but no shortness of breath.  She is not winded when she spoke .      Assessment / Plan:      Diagnoses and all orders for this visit:    Abnormal CT scan of lung  -     CARDIO - INTERNAL  -     PULMONARY - INTERNAL  -     Quantiferon TB Plus; Future  -     Sed Rate, Westergren (Automated); Future  -     C-Reactive Protein [E]; Future  Discussed all the bullet points on the CAT scan.  Went ahead and did a referral for pulmonology and cardiology.  Also lab work ordered.  Pulmonary nodules/lesions, multiple  -     PULMONARY - INTERNAL  -     Quantiferon TB Plus; Future  -     Sed  Brayan Chairez (Automated); Future  -     C-Reactive Protein [E]; Future    Atherosclerosis of native coronary artery of native heart without angina pectoris  -     CARDIO - INTERNAL  -     C-RP/High Sensitivity [E]; Future  Continue your atorvastatin but I think best to see cardiology to further evaluate the coronary artery calcification in the LAD.  Acute cough  -     azithromycin (ZITHROMAX Z-LONI) 250 MG Oral Tab; Take 2 tablets (500 mg total) by mouth daily for 1 day, THEN 1 tablet (250 mg total) daily for 4 days.  Considering the whole family has it I suspect this is viral but because of the abnormal nature of her CAT scan I am going to place her on a Z-Loni as she is quite overwhelmed.  Atypical mycobacterial infection of lung (HCC)  See pulmonary to see if she truly has an infection or if it is just inflammation.  Right thyroid nodule  She was not sure when she was supposed to do the right thyroid nodule ultrasound follow-up that Dr. Baires had written for.  Reviewed his note and saw that he wanted this done 6 months from the prior 1.      Medical History:         Reviewed Allergies:  No Known Allergies   Reviewed Past Medical History:  Past Medical History:   Diagnosis Date    Anxiety     Cancer (HCC) 2016    breast    Cerebrovascular disease 2020    Colon polyps 2023    repeat CLN in 2026    Essential hypertension     High cholesterol     History of colposcopy     Hyperlipidemia     Lipid screening 05/29/2014    Per [facility]    Microvascular ischemia of myocardium     Multiple allergies     Per [facility]:  \"Allergies.\"    Osteoporosis screening 02/10/2011    Per [facility]    Silent cerebral infarction (HCC) 2020    Stroke ([facility])       Reviewed Family History:  Family History   Problem Relation Age of Onset    Cancer Maternal Grandmother         breast cancer       Reviewed Social History:  Social History     Socioeconomic History    Marital status:    Tobacco Use    Smoking status: Former     Packs/day:  .5     Types: Cigarettes     Quit date: 2011     Years since quittin.0    Smokeless tobacco: Never   Vaping Use    Vaping Use: Never used   Substance and Sexual Activity    Alcohol use: Yes     Alcohol/week: 0.0 standard drinks of alcohol     Comment: Wine, socially    Drug use: No   Other Topics Concern    Caffeine Concern Yes     Comment: (Coffee, Tea) 1 cup daily      Reviewed Current Medications:  Current Outpatient Medications   Medication Sig Dispense Refill    atorvastatin 20 MG Oral Tab TAKE 1 TABLET BY MOUTH EVERY NIGHT FOR CHOLESTEROL 90 tablet 2    ALPRAZolam 0.25 MG Oral Tab Take 1 tablet (0.25 mg total) by mouth nightly as needed for Sleep or Anxiety. (Inform patient when ready.) 30 tablet 0    amLODIPine 5 MG Oral Tab Take 1 tablet (5 mg total) by mouth daily. For high blood pressure. 90 tablet 1    diphenhydrAMINE HCl, Sleep, (SIMPLY SLEEP) 25 MG Oral Tab Take 1 tablet (25 mg total) by mouth nightly as needed for Sleep.      aspirin 81 MG Oral Tab EC Take 1 tablet (81 mg total) by mouth daily. 90 tablet 3            Kaitlin Schroeder advised to follow CDC guidelines for self isolation and symptomatic treatment as outlined on CDC Patient Guidelines. Kaitlin Schroeder understands video evaluation is not a substitute for face-to-face examination or emergency care. Patient advised to go to ER or call 911 for worsening symptoms or acute distress. (NOTE: Not every complaint above will be related to the COVID-19 pandemic).    Please note that the following visit was completed using two-way, real-time interactive audio and/or video communication.  This has been done in good tony to provide continuity of care in the best interest of the provider-patient relationship, due to the ongoing public health crisis/national emergency and because of restrictions of visitation.  There are limitations of this visit as no physical exam could be performed.  Every conscious effort was taken to allow for sufficient  and adequate time.  This billing was spent on reviewing labs, medications, radiology tests and decision making.  Appropriate medical decision-making and tests are ordered as detailed in the plan of care above.    Bryant Waldron  1/15/2024     By signing my name below, IBryant,  attest that this documentation has been prepared under the direction and in the presence of Francisco Pham DO.   Electronically Signed: Bryant Waldron, 1/15/2024, 12:58 PM.    I, Francisco Pham DO,  personally performed the services described in this documentation. All medical record entries made by the scribe were at my direction and in my presence.  I have reviewed the chart and discharge instructions (if applicable) and agree that the record reflects my personal performance and is accurate and complete.  Francisco Pham DO, 1/15/2024, 2:02 PM

## 2024-01-16 ENCOUNTER — OFFICE VISIT (OUTPATIENT)
Dept: PULMONOLOGY | Facility: CLINIC | Age: 64
End: 2024-01-16

## 2024-01-16 ENCOUNTER — LAB ENCOUNTER (OUTPATIENT)
Dept: LAB | Age: 64
End: 2024-01-16
Attending: FAMILY MEDICINE
Payer: COMMERCIAL

## 2024-01-16 VITALS
HEIGHT: 62 IN | DIASTOLIC BLOOD PRESSURE: 60 MMHG | SYSTOLIC BLOOD PRESSURE: 120 MMHG | HEART RATE: 78 BPM | BODY MASS INDEX: 24.48 KG/M2 | WEIGHT: 133 LBS | OXYGEN SATURATION: 99 %

## 2024-01-16 DIAGNOSIS — Z87.891 HISTORY OF TOBACCO USE: ICD-10-CM

## 2024-01-16 DIAGNOSIS — R91.8 PULMONARY NODULES/LESIONS, MULTIPLE: ICD-10-CM

## 2024-01-16 DIAGNOSIS — R91.8 ABNORMAL CT SCAN OF LUNG: ICD-10-CM

## 2024-01-16 DIAGNOSIS — R91.8 PULMONARY NODULES: ICD-10-CM

## 2024-01-16 DIAGNOSIS — R91.8 GROUND GLASS OPACITY PRESENT ON IMAGING OF LUNG: Primary | ICD-10-CM

## 2024-01-16 DIAGNOSIS — I25.10 ATHEROSCLEROSIS OF NATIVE CORONARY ARTERY OF NATIVE HEART WITHOUT ANGINA PECTORIS: ICD-10-CM

## 2024-01-16 LAB
CRP SERPL HS-MCNC: 3.39 MG/L (ref ?–3)
CRP SERPL-MCNC: <0.4 MG/DL (ref ?–1)
ERYTHROCYTE [SEDIMENTATION RATE] IN BLOOD: 23 MM/HR

## 2024-01-16 PROCEDURE — 3074F SYST BP LT 130 MM HG: CPT | Performed by: PHYSICIAN ASSISTANT

## 2024-01-16 PROCEDURE — 3008F BODY MASS INDEX DOCD: CPT | Performed by: PHYSICIAN ASSISTANT

## 2024-01-16 PROCEDURE — 85652 RBC SED RATE AUTOMATED: CPT

## 2024-01-16 PROCEDURE — 86480 TB TEST CELL IMMUN MEASURE: CPT

## 2024-01-16 PROCEDURE — 36415 COLL VENOUS BLD VENIPUNCTURE: CPT

## 2024-01-16 PROCEDURE — 3078F DIAST BP <80 MM HG: CPT | Performed by: PHYSICIAN ASSISTANT

## 2024-01-16 PROCEDURE — 86140 C-REACTIVE PROTEIN: CPT

## 2024-01-16 PROCEDURE — 86141 C-REACTIVE PROTEIN HS: CPT

## 2024-01-16 PROCEDURE — 99244 OFF/OP CNSLTJ NEW/EST MOD 40: CPT | Performed by: PHYSICIAN ASSISTANT

## 2024-01-16 NOTE — PROGRESS NOTES
Pulmonary Consult Note    History of Present Illness:  Kaitlin Schroeder is a 63 year old female presenting to pulmonary clinic today for abnormal CT scan of the chest, referred by Dr. Pham PCP.  The patient had a CT scan of the head in December 2023 which incidentally discovered groundglass opacities in visible upper lobes of the lungs.  She subsequently had dedicated CT scan of the chest which demonstrated nonspecific groundglass opacities bilaterally, largest in right upper lobe measuring 1.1 cm.  The patient is a former smoker, quit 13 years ago after having smoked on and off for 20 years about half a pack per day.  The patient has no known history of lung disease, pneumonia, or TB exposure.  The patient's maternal grandfather had a history of lung cancer and worked in the coal mining industry.  The patient was born in [place], moved to [place] at age 8, lived in [place] for 7 years as an adult, and then moved to [place] and [place].  She describes a new nonproductive cough which started about 3 days ago, and several of her coworkers have similar symptoms.  Prior to this, she had no cough, shortness of breath, wheezing, hemoptysis.  No weight loss or change in appetite.  No fevers.     Past Medical History: TIA, hyperlipidemia, breast cancer    Past Surgical History: Left breast lumpectomy, right breast cyst removal    Family Medical History: Mother alive with depression and peripheral arterial disease, father alive with history of prostate cancer    Social History: , has 2 kids, works part-time as   -Tobacco: Former smoker, quit 13 years ago at age 50 after having smoked on and off for 0.5 ppd for 20 years  -Alcohol: Occasional  -Pets: None  -Exposure: None    Allergies: Patient has no known allergies.     Medications: has a current medication list which includes the following prescription(s): azithromycin, atorvastatin, alprazolam, amlodipine, simply sleep, and aspirin.    Review of Systems:    Constitutional: No fever or chills. No weight loss or weight gain.  HEENT: No vision changes. No congestion, postnasal drip, or sore throat.  Cardio: No chest pain or palpitations.  Respiratory: See HPI.  GI: No acid reflux.  Extremities: No lower extremity swelling or pain.   Neurologic: No headache.  Skin: No rash.  Psych: No depression.     Physical Exam:  /60   Pulse 78   Ht 5' 2\" (1.575 m)   Wt 133 lb (60.3 kg)   SpO2 99%   BMI 24.33 kg/m²    Constitutional: No acute distress.  HEENT: Head NC/AT. PEERL. No tonsillar or uvula enlargement.  Neck: Enlarged thyroid.  Cardio: Regular rate and rhythm. Normal S1 and S2. No murmurs.   Respiratory: Thorax symmetrical with no labored breathing. Clear to ausculation bilaterally with symmetrical breath sounds. No wheezing, rhonchi, or crackles.   Extremities: No clubbing or cyanosis. No LE edema. No calf tenderness.  Neurologic: A&Ox3. No gross motor deficits.  Skin: Warm, dry.  Lymphatic: No cervical or supraclavicular lymphadenopathy.  Psych: Calm, cooperative. Pleasant affect.    Results:  CT chest 1/12/24:  1. Distal endobronchial impaction and reticulonodular opacities throughout the right middle lobe and to a lesser degree lingula.  Findings suggest small airways infection/bronchiolitis (probably chronic/related to atypical mycobacterial infection).  This    can be reassessed on anticipated follow-up to ensure stability-see below.   2. Scattered bilateral solid and ground-glass density nodules throughout both lungs, the largest of which is a 1.1 cm ground-glass density nodule in the right upper lobe.  The nodules at the lung apices are unchanged since prior CT arteriography of the neck from December, 2023 whereas the basilar nodules were not included in the field of view on that exam.  Follow-up chest CT in 6 months is advised to ensure stability.   3. Low-grade reticulonodular opacities in the left upper lobe likely relate to small airways infection and  are unchanged since prior.   4. Left anterior descending coronary artery calcification.   5. Stable 1.5 cm low-attenuation right thyroid nodule.   6. Small retrocardiac hiatal hernia.   7. Surgical changes in the inner left breast.  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     Assessment/Plan:  Abnormal CT chest  CT chest with nonspecific findings, groundglass opacities bilaterally with largest measuring 1.1 cm in right upper lobe.  Patient is essentially asymptomatic with no chronic cough, hemoptysis, dyspnea, weight loss, or fever.  Exam is unremarkable.  CT chest and case reviewed in detail with Dr. Prieto - recommends follow-up CT chest in 6 months.  Plan:  -Follow-up CT chest in 6 months  -Reassurance provided    History of tobacco use  Quit 13 years ago.  Does not qualify for low-dose CT screening program.  Plan:  -Ongoing tobacco abstinence    Sabas Lipscomb PA-C  Pulmonary Medicine  1/16/2024

## 2024-01-18 LAB
M TB IFN-G CD4+ T-CELLS BLD-ACNC: 0 IU/ML
M TB TUBERC IFN-G BLD QL: NEGATIVE
M TB TUBERC IGNF/MITOGEN IGNF CONTROL: >10 IU/ML
QFT TB1 AG MINUS NIL: 0.02 IU/ML
QFT TB2 AG MINUS NIL: 0 IU/ML

## 2024-02-15 ENCOUNTER — TELEPHONE (OUTPATIENT)
Dept: NEUROLOGY | Facility: CLINIC | Age: 64
End: 2024-02-15

## 2024-02-15 ENCOUNTER — LAB ENCOUNTER (OUTPATIENT)
Dept: LAB | Facility: HOSPITAL | Age: 64
End: 2024-02-15
Attending: Other
Payer: COMMERCIAL

## 2024-02-15 ENCOUNTER — OFFICE VISIT (OUTPATIENT)
Dept: NEUROLOGY | Facility: CLINIC | Age: 64
End: 2024-02-15
Payer: COMMERCIAL

## 2024-02-15 VITALS — HEIGHT: 62 IN | BODY MASS INDEX: 24.48 KG/M2 | WEIGHT: 133 LBS

## 2024-02-15 DIAGNOSIS — R20.2 PARESTHESIA OF HAND, BILATERAL: ICD-10-CM

## 2024-02-15 DIAGNOSIS — G45.9 TIA (TRANSIENT ISCHEMIC ATTACK): Primary | ICD-10-CM

## 2024-02-15 DIAGNOSIS — I67.1 INTRACRANIAL ANEURYSM (HCC): ICD-10-CM

## 2024-02-15 DIAGNOSIS — G44.209 TENSION HEADACHE: Primary | ICD-10-CM

## 2024-02-15 DIAGNOSIS — E78.2 MIXED HYPERLIPIDEMIA: ICD-10-CM

## 2024-02-15 DIAGNOSIS — G45.9 TIA (TRANSIENT ISCHEMIC ATTACK): ICD-10-CM

## 2024-02-15 LAB
CHOLEST SERPL-MCNC: 204 MG/DL (ref ?–200)
FASTING PATIENT LIPID ANSWER: YES
HDLC SERPL-MCNC: 67 MG/DL (ref 40–59)
LDLC SERPL CALC-MCNC: 117 MG/DL (ref ?–100)
NONHDLC SERPL-MCNC: 137 MG/DL (ref ?–130)
TRIGL SERPL-MCNC: 113 MG/DL (ref 30–149)
VLDLC SERPL CALC-MCNC: 20 MG/DL (ref 0–30)

## 2024-02-15 PROCEDURE — 36415 COLL VENOUS BLD VENIPUNCTURE: CPT

## 2024-02-15 PROCEDURE — 80061 LIPID PANEL: CPT

## 2024-02-15 RX ORDER — ATORVASTATIN CALCIUM 40 MG/1
TABLET, FILM COATED ORAL
Qty: 90 TABLET | Refills: 3 | Status: SHIPPED | OUTPATIENT
Start: 2024-02-15

## 2024-02-15 NOTE — PROGRESS NOTES
Neurology follow-up visit     Referred By: Dr. Sands ref. provider found    Chief Complaint:   Chief Complaint   Patient presents with    Headache     LOV 9/8/2020 - The pt presents stating that she was informed to follow up with her neurologist d/t abnormal findings of her MRI/MRA. States that she had 2 aneurysms that were found. Pt states that she has been having HA's 2-3 times per week. Currently taking asa and Advil prn for the HA's.        HPI:     Kaitlin Schroeder is a 63 year old female, who presents for possible headache and vertigo.  Since spring 2020 patient started developed some kind of feeling of pressure/headache,/almost tingling feeling.  Not very painful.  Essentially every day.  It feels on the edge of something being there.  Meantime patient also developed vertiginous episodes lasting for few seconds, typically triggered by moving of the head.  She started vestibular therapy who confirmed that most likely this was presumably benign positional paroxysmal vertigo.  Patient denies any other focal neurological symptoms otherwise.  She is under a lot of stress with her daughter who is bipolar.  Also patient does not get good sleep, she snores, she has apneic episodes, she does not get restful sleep.  Some movement of limbs at night.     MRI of the brain revealed old lacunar stroke in the right cerebellum.  Echocardiogram, Doppler of carotids, Holter monitor were ordered as well as lipid panel.  Patient was started on aspirin.  She already was on statin.  She quit smoking 7 years ago.    Came back for follow-up in February 2024, 4 years later.  Apparently in end of 2023 she had another episode of right hand numbness, headaches and disorientation.  For which she presented to the emergency room.  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  She will follow-up with the vascular neurosurgeon after that.  MRA in 1 year was planned, no intervention was  required.          Past Medical History:   Diagnosis Date    Anxiety     Cancer (HCC) 2016    breast    Cerebrovascular disease 2020    Colon polyps 2023    repeat CLN in 2026    Essential hypertension     High cholesterol     History of colposcopy     Hyperlipidemia     Lipid screening 05/29/2014    Per [facility]    Microvascular ischemia of myocardium     Multiple allergies     Per [facility]:  \"Allergies.\"    Osteoporosis screening 02/10/2011    Per [facility]    Silent cerebral infarction (HCC) 2020    Stroke (HCC)        Past Surgical History:   Procedure Laterality Date    COLONOSCOPY  08/27/2018    Dr Contreras    COLONOSCOPY N/A 4/19/2023    Procedure: COLONOSCOPY;  Surgeon: PASQUALE Contreras MD;  Location: [facility] ENDO    OTHER SURGICAL HISTORY Left 11/2016    lumpectomy     OTHER SURGICAL HISTORY Right 11/2014    breast cyst    OTHER SURGICAL HISTORY Right 11/1988    breast cyst       Social history:  History   Smoking Status    Former    Packs/day: 0.50    Types: Cigarettes    Quit date: 1/1/2011   Smokeless Tobacco    Never     History   Alcohol Use    0.0 standard drinks of alcohol/week     Comment: Wine, socially     History   Drug Use No       Family History   Problem Relation Age of Onset    Cancer Maternal Grandmother         breast cancer         Current Outpatient Medications:     atorvastatin 20 MG Oral Tab, TAKE 1 TABLET BY MOUTH EVERY NIGHT FOR CHOLESTEROL, Disp: 90 tablet, Rfl: 2    ALPRAZolam 0.25 MG Oral Tab, Take 1 tablet (0.25 mg total) by mouth nightly as needed for Sleep or Anxiety. (Inform patient when ready.), Disp: 30 tablet, Rfl: 0    amLODIPine 5 MG Oral Tab, Take 1 tablet (5 mg total) by mouth daily. For high blood pressure., Disp: 90 tablet, Rfl: 1    diphenhydrAMINE HCl, Sleep, (SIMPLY SLEEP) 25 MG Oral Tab, Take 1 tablet (25 mg total) by mouth nightly as needed for Sleep., Disp: , Rfl:     aspirin 81 MG Oral Tab EC, Take 1 tablet (81 mg total) by mouth daily., Disp: 90 tablet, Rfl: 3    No  Known Allergies    ROS:   As in HPI, the rest of the 14 system review was done and was negative      Physical Exam:  Vitals:    02/15/24 0906   Weight: 133 lb (60.3 kg)   Height: 62\"       General: No apparent distress, well nourished, well groomed.  Head- Normocephalic, atraumatic  Eyes- No redness or swelling  ENT- Hearing intake, normal glutition  Neck- No masses or adenopathy    Neurological:     Mental Status- Alert and oriented x3.  Normal attention span and concentration  Thought process intact  Memory intact- recent and remote  Mood intact  Fund of knowledge appropriate for education and age    Language intact including: comprehension, naming, repetition, vocabulary    Cranial Nerves:    VII. Face symmetric, no facial weakness  VIII. Hearing intact to whisper.  IX. Pallet elevates symmetrically.  XI. Shoulder shrug is intact  XII. Tongue is midline    Motor Exam:  Muscle tone normal  No atrophy or fasciculations  Strength- upper extremities 5/5 proximally and distally  Rapid alternating movements intact    Gait:  Normal posture  Normal physiologic      Labs:    Lab Results   Component Value Date    TSH 0.538 05/23/2023     Lab Results   Component Value Date    HDL 62 (H) 05/23/2023     (H) 05/23/2023    TRIG 142 05/23/2023     Lab Results   Component Value Date    HGB 13.7 12/12/2023    HCT 41.4 12/12/2023    MCV 92.0 12/12/2023    WBC 5.1 12/12/2023    .0 12/12/2023      Lab Results   Component Value Date    BUN 10 12/12/2023    CA 10.3 12/12/2023    ALT 28 12/12/2023    AST 24 12/12/2023    ALKPHOS 57 06/01/2016    ALB 4.9 (H) 12/12/2023     12/12/2023    K 4.2 12/12/2023     12/12/2023    CO2 28.0 12/12/2023      I have reviewed labs.    MRI of the brain was independently reviewed together with the patient as above.    MRI of the brain that was done in December 2023 and was independently reviewed again together with the patient, same lacuna chronic stroke in the right cerebellum  but no new changes.    Assessment   1. Insomnia, unspecified type    2.  Stroke.  We reviewed MRI, echocardiogram, Holter monitor will be done again, lipid panel will be done again, goal for LDL is below 70.  She will continue treating her hypertension with her primary care doctor as well.    3.  Cerebral aneurysm.  She will follow-up with Dr. Leyva.  At this point apparently no need for intervention.           Education and counseling provided to patient. Instructed patient to call my office or seek medical attention immediately if symptoms worsen.  Patient verbalized understanding of information given. All questions were answered. All side effects of drugs were discussed.     Return to clinic in: No follow-ups on file.    Hunter Younger MD

## 2024-02-15 NOTE — TELEPHONE ENCOUNTER
Called & spoke to patient to notify of result. Discussed lab values & ranges. Pt agreeable to increase dose of statin.

## 2024-02-15 NOTE — TELEPHONE ENCOUNTER
----- Message from Hunter Younger MD sent at 2/15/2024 10:39 AM CST -----  Please let the patient know that LDL is still elevated, I will suggest to increase the dose of statin, if she agrees we can adjust her prescription.    Thank you

## 2024-02-28 ENCOUNTER — ORDER TRANSCRIPTION (OUTPATIENT)
Dept: ADMINISTRATIVE | Facility: HOSPITAL | Age: 64
End: 2024-02-28

## 2024-02-28 DIAGNOSIS — I25.10 CAD (CORONARY ARTERY DISEASE): ICD-10-CM

## 2024-02-28 DIAGNOSIS — R06.02 SOB (SHORTNESS OF BREATH): ICD-10-CM

## 2024-02-28 DIAGNOSIS — I63.9 ACUTE CVA (CEREBROVASCULAR ACCIDENT) (HCC): Primary | ICD-10-CM

## 2024-02-28 DIAGNOSIS — R06.02 SOB (SHORTNESS OF BREATH): Primary | ICD-10-CM

## 2024-02-28 DIAGNOSIS — E78.5 HYPERLIPIDEMIA: ICD-10-CM

## 2024-03-10 ENCOUNTER — HOSPITAL ENCOUNTER (OUTPATIENT)
Dept: RESPIRATORY THERAPY | Facility: HOSPITAL | Age: 64
Discharge: HOME | End: 2024-03-10
Attending: STUDENT IN AN ORGANIZED HEALTH CARE EDUCATION/TRAINING PROGRAM
Payer: COMMERCIAL

## 2024-03-10 DIAGNOSIS — R06.02 SOB (SHORTNESS OF BREATH): ICD-10-CM

## 2024-03-10 PROCEDURE — 94060 EVALUATION OF WHEEZING: CPT | Performed by: INTERNAL MEDICINE

## 2024-03-10 PROCEDURE — 94726 PLETHYSMOGRAPHY LUNG VOLUMES: CPT | Performed by: INTERNAL MEDICINE

## 2024-03-10 PROCEDURE — 94729 DIFFUSING CAPACITY: CPT | Performed by: INTERNAL MEDICINE

## 2024-03-11 ENCOUNTER — TELEPHONE (OUTPATIENT)
Dept: PULMONOLOGY | Facility: CLINIC | Age: 64
End: 2024-03-11

## 2024-03-11 NOTE — TELEPHONE ENCOUNTER
Spoke with patient and informed of Sabas KRISHNA's message below. Patient verbalized understanding. Patient wondering if inhaler needs to be ordered for her?

## 2024-03-11 NOTE — TELEPHONE ENCOUNTER
Pt calling for Breath test.  She states that she had it done yesterday.  Pt states that she is suppose to fly tomorrow and she is a little scared to fly without knowing results.  She states the cough is still present.  Please call

## 2024-03-11 NOTE — TELEPHONE ENCOUNTER
You may let the patient know I reviewed her PFTs and they look normal. Please let her know they need to be interpreted by pulmonologist for final results and will send her message on Plerts.

## 2024-03-12 NOTE — PROCEDURES
[facility]  part of [facility]    PFT Interpretation    Kaitlin Schroeder     1960 MRN [id number]   Height  62 inh  Age 63 year old   Weight  133 lbs  Sex Female         Spirometry:   FEV1 2.23 L is 99%  FEV1/FVC ratio 71%    No significant bronchodilator response      FVL:   Normal      Lung Volume:   Within normal limits  TLC 5.21 L is 110%  VC 3.13 L is 113%      DLCO:   89% which is normal      Impression:   Normal PFTs  No significant bronchodilator response        Thank you for allowing me to participate in the care of your patient.    Bouchra Prieto MD  3/12/2024  4:33 PM

## 2024-03-12 NOTE — ADDENDUM NOTE
Encounter addended by: Bouchra Prieto MD on: 3/12/2024 4:35 PM   Actions taken: Clinical Note Signed, Charge Capture section accepted

## 2024-03-13 NOTE — TELEPHONE ENCOUNTER
You might offer her a follow up appointment as these symptoms seem new. When I saw her in January she reported 3 days of cough and had been around others who were sick with cold like symptoms. She reported no shortness of breath.

## 2024-03-13 NOTE — TELEPHONE ENCOUNTER
Spoke with patient and informed of Sabas KRISHNA's message below. Patient reports she has difficulty breathing with walking- \"feels like I'm out of breath.\" Patient also reports she is still coughing- especially when laying down.

## 2024-03-13 NOTE — TELEPHONE ENCOUNTER
Spoke with patient and informed of Sabas KRISHNA's message below. Appointment scheduled for 3/21 at 1PM with Sabas KRISHNA. Verified date, time, place, and where to park. Patient verbalized understanding.

## 2024-03-21 ENCOUNTER — OFFICE VISIT (OUTPATIENT)
Dept: PULMONOLOGY | Facility: CLINIC | Age: 64
End: 2024-03-21
Payer: COMMERCIAL

## 2024-03-21 VITALS
OXYGEN SATURATION: 99 % | BODY MASS INDEX: 24.48 KG/M2 | HEART RATE: 70 BPM | SYSTOLIC BLOOD PRESSURE: 115 MMHG | HEIGHT: 62 IN | WEIGHT: 133 LBS | RESPIRATION RATE: 14 BRPM | DIASTOLIC BLOOD PRESSURE: 74 MMHG

## 2024-03-21 DIAGNOSIS — R05.3 CHRONIC COUGH: Primary | ICD-10-CM

## 2024-03-21 DIAGNOSIS — G47.33 OSA (OBSTRUCTIVE SLEEP APNEA): ICD-10-CM

## 2024-03-21 DIAGNOSIS — K21.9 GASTROESOPHAGEAL REFLUX DISEASE, UNSPECIFIED WHETHER ESOPHAGITIS PRESENT: ICD-10-CM

## 2024-03-21 DIAGNOSIS — G47.10 HYPERSOMNIA: ICD-10-CM

## 2024-03-21 DIAGNOSIS — R91.8 GROUND GLASS OPACITY PRESENT ON IMAGING OF LUNG: ICD-10-CM

## 2024-03-21 PROBLEM — J06.9 VIRAL UPPER RESPIRATORY TRACT INFECTION: Status: RESOLVED | Noted: 2022-08-24 | Resolved: 2024-03-21

## 2024-03-21 PROCEDURE — 3008F BODY MASS INDEX DOCD: CPT | Performed by: PHYSICIAN ASSISTANT

## 2024-03-21 PROCEDURE — 99214 OFFICE O/P EST MOD 30 MIN: CPT | Performed by: PHYSICIAN ASSISTANT

## 2024-03-21 PROCEDURE — 3078F DIAST BP <80 MM HG: CPT | Performed by: PHYSICIAN ASSISTANT

## 2024-03-21 PROCEDURE — 3074F SYST BP LT 130 MM HG: CPT | Performed by: PHYSICIAN ASSISTANT

## 2024-03-21 RX ORDER — OMEPRAZOLE 40 MG/1
40 CAPSULE, DELAYED RELEASE ORAL DAILY
Qty: 30 CAPSULE | Refills: 0 | Status: SHIPPED | OUTPATIENT
Start: 2024-03-21

## 2024-03-21 NOTE — PROGRESS NOTES
Pulmonary Progress Note    History of Present Illness:  Kaitlin Schroeder is a 63 year old female presenting to pulmonary clinic today for cough. She had a cold in December and cough is lingering. Her  told her she has been coughing for several months prior to this. Cough is bothersome to her and others around her. Cough is nonproductive and worse at night when laying down. She has history of allergic rhinitis and sinus problems and has used Flonase in the past. She clears her throat. She has acid reflux a few times per week with retrosternal burning pain. She sleeps propped up with a few pillows as this helps the cough. She has occasional 'chest heaviness' which she describes as not being able to take a deep breath. She notes dyspnea with 14 flights of stairs when elevator in her condo building was broken. Otherwise she does not feel limited by her breathing. No wheezing. No pleurisy. No palpitations. She had CT chest January 2024 which demonstrated nonspecific ground-glass opacities and bronchiolitis. She received azithromycin without improvement. Pulmonary function testing was normal. She has had cardiac work up including relatively unrevealing nuclear stress test and echocardiogram.     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 Did not pursue CPAP. She snores and has woken up gasping for air in the past. She goes to bed at 10:30 pm, has difficultly initiating sleep, and awakens at 5 am. She often lays awake for up to 2 hours. This is chronic problem for many years and is worsening. She drinks 1 cup of coffee in the morning. She watches tv in bed. Sleep is unrefreshing and she has unrefreshing sleep. She does not take intentional naps but she does doze off while watching tv. She has frequent nocturnal awakenings with difficulty falling back to sleep. She has stress and anxiety and worries about her daughter's mental health. She takes Simply Sleep (benadryl) before bed which helps  some. She did not like trazodone. She has confusion with Ambien. She has taken alprazolam in the past with improvement. No morning headaches. No drowsy driving. She has urge to move the limbs. No hypnagogic hallucinations or sleep paralysis. [place] Sleepiness Scale score is 4/24.    Social History: , has 2 kids, works part-time as   -Tobacco: Former smoker, quit 13 years ago at age 50 after having smoked on and off for 0.5 ppd for 20 years  -Alcohol: Occasional  -Pets: None  -Exposure: None    Medications: has a current medication list which includes the following prescription(s): atorvastatin, alprazolam, amlodipine, simply sleep, and aspirin.    Review of Systems:   Constitutional: No fever or chills. No weight loss or weight gain.  HEENT: +Postnasal drainage.  Cardio: No chest pain.  Respiratory: See HPI.  GI: +Acid reflux.  Extremities: No lower extremity swelling or pain.   Neurologic: No headache.  Psych: +Anxiety. No depression.     Physical Exam:  Resp 14   Ht 5' 2\" (1.575 m)   Wt 133 lb (60.3 kg)   SpO2 99%   BMI 24.33 kg/m²    Constitutional: No acute distress.   HEENT: Head NC/AT. PEERL. No tonsillar or uvula enlargement.   Cardio: Regular rate and rhythm. Normal S1 and S2. No murmurs.   Respiratory: Thorax symmetrical with no labored breathing. Clear to ausculation bilaterally with symmetrical breath sounds. No wheezing, rhonchi, or crackles.   Extremities: No clubbing or cyanosis. No LE edema. No calf tenderness.  Neurologic: A&Ox3. No gross motor deficits.  Skin: Warm, dry.  Psych: Calm, cooperative. Pleasant affect.    Results:  -Home sleep study 2020: Combined AHI 9, supine AHI over 30    -UGI esophagram 11/2023: Small sliding hiatal hernia with mild gastroesophageal reflux.    -CT chest 1/2024: Distal endobronchial impaction and reticulonodular opacities throughout right middle lobe and lingula. Findings suggest small airways infection/bronchiolitis. Scattered bilateral  solid and ground-glass nodules, largest in RUL measuring 1.1 cm.    -Echo 2/2024: LVEF 65%. Normal LV diastolic function. Moderate tricuspid regurgitation. PASP mildly elevated, estimated at 41 mm Hg.    -Nuclear stress test 2/2024: Stress EKG abnormal due to 1 mm ST depression. Functional capacity is good. Rare PACs. Rare PVCs. Normal perfusion study. LVEF 83% and LV global function is hyperkinetic. Scan is suggestive of low risk for future cardiovascular events.    -PFTs 3/2024: Normal. No significant bronchodilator response. FEV1 2.23 L (99% pred), FEV1/FVC 71%, %, DLCO 89%.    Assessment/Plan:  Chronic cough  Exam unremarkable. History notable for acid reflux. Hiatal hernia on esophagram noted.  Plan:  -Trial of omeprazole 40 mg daily  -Patient to call/send [employer] message in 3-4 weeks with update on how she is doing    Abnormal CT chest  CT chest with nonspecific findings, groundglass opacities bilaterally with largest measuring 1.1 cm in right upper lobe. Cough is nonproductive so cannot obtain sputum sample. Previously reviewed CT chest with Dr. Prieto and decision for follow up CT chest in 6 months.  Plan:  -6 month follow up CT chest (due July 2024)    NEO and insomnia  History of mild NEO which is severe in supine position (home sleep study 2020). Did not pursue CPAP. She has history of TIA and CVA. She has significant symptoms which are in part due to insomnia. Has urge to move the limbs so will evaluate for periodic limb movements as well. Given comorbidities and symptoms, recommend eventual CPAP.  Plan:  -Polysomnography  -Sleep hygiene    Sabas Lipscomb PA-C  Pulmonary Medicine  3/21/2024

## 2024-03-22 RX ORDER — OMEPRAZOLE 40 MG/1
40 CAPSULE, DELAYED RELEASE ORAL DAILY
Qty: 90 CAPSULE | Refills: 0 | OUTPATIENT
Start: 2024-03-22

## 2024-03-27 ENCOUNTER — HOSPITAL ENCOUNTER (OUTPATIENT)
Dept: CV DIAGNOSTICS | Facility: HOSPITAL | Age: 64
Discharge: HOME | End: 2024-03-27
Attending: Other

## 2024-03-27 DIAGNOSIS — G45.9 TIA (TRANSIENT ISCHEMIC ATTACK): ICD-10-CM

## 2024-03-27 PROCEDURE — 93227 XTRNL ECG REC<48 HR R&I: CPT | Performed by: OTHER

## 2024-03-27 PROCEDURE — 93225 XTRNL ECG REC<48 HRS REC: CPT | Performed by: OTHER

## 2024-03-27 PROCEDURE — 93306 TTE W/DOPPLER COMPLETE: CPT | Performed by: OTHER

## 2024-03-28 NOTE — PROGRESS NOTES
Please let the patient know that echocardiogram didn't show signs of the a. Fib or any holes that could have contributed to the TIA/stroke symptoms.

## 2024-04-12 ENCOUNTER — HOSPITAL ENCOUNTER (OUTPATIENT)
Dept: CT IMAGING | Facility: HOSPITAL | Age: 64
Discharge: HOME | End: 2024-04-12
Attending: STUDENT IN AN ORGANIZED HEALTH CARE EDUCATION/TRAINING PROGRAM
Payer: COMMERCIAL

## 2024-04-12 VITALS
WEIGHT: 130 LBS | BODY MASS INDEX: 23.92 KG/M2 | HEART RATE: 57 BPM | DIASTOLIC BLOOD PRESSURE: 73 MMHG | SYSTOLIC BLOOD PRESSURE: 148 MMHG | HEIGHT: 62 IN | RESPIRATION RATE: 9 BRPM

## 2024-04-12 DIAGNOSIS — R06.02 SOB (SHORTNESS OF BREATH): ICD-10-CM

## 2024-04-12 LAB
CREAT BLD-MCNC: 0.5 MG/DL
EGFRCR SERPLBLD CKD-EPI 2021: 105 ML/MIN/1.73M2 (ref 60–?)

## 2024-04-12 PROCEDURE — 75574 CT ANGIO HRT W/3D IMAGE: CPT | Performed by: STUDENT IN AN ORGANIZED HEALTH CARE EDUCATION/TRAINING PROGRAM

## 2024-04-12 PROCEDURE — 82565 ASSAY OF CREATININE: CPT

## 2024-04-12 PROCEDURE — 75580 N-INVAS EST C FFR SW ALY CTA: CPT | Performed by: STUDENT IN AN ORGANIZED HEALTH CARE EDUCATION/TRAINING PROGRAM

## 2024-04-12 RX ORDER — NITROGLYCERIN 0.4 MG/1
TABLET SUBLINGUAL
Status: DISPENSED
Start: 2024-04-12 | End: 2024-04-12

## 2024-04-12 RX ORDER — NITROGLYCERIN 0.4 MG/1
0.4 TABLET SUBLINGUAL ONCE
Status: COMPLETED | OUTPATIENT
Start: 2024-04-12 | End: 2024-04-12

## 2024-04-12 RX ORDER — DILTIAZEM HYDROCHLORIDE 5 MG/ML
5 INJECTION INTRAVENOUS SEE ADMIN INSTRUCTIONS
Status: DISCONTINUED | OUTPATIENT
Start: 2024-04-12 | End: 2024-04-14

## 2024-04-12 RX ORDER — METOPROLOL TARTRATE 1 MG/ML
5 INJECTION, SOLUTION INTRAVENOUS SEE ADMIN INSTRUCTIONS
Status: DISCONTINUED | OUTPATIENT
Start: 2024-04-12 | End: 2024-04-14

## 2024-04-12 RX ADMIN — NITROGLYCERIN 0.4 MG: 0.4 TABLET SUBLINGUAL at 08:14:00

## 2024-04-12 NOTE — IMAGING NOTE
TO RAD HOLDING AT 0747       HX TAKEN: FOUND BLOCKAGE OF ARTERIES TOLD AFTER HAVING A SCREENING    PT CONSENTED AT 0756      BASELINE VITAL SIGNS: HR 54  /73 BMI 23.8    CTA ORDERED BY DR HIGH  WAS PT GIVEN CTA PREMEDS NO, IF YES METOPROLOL    MG LAST THI AND THIS AM    18 GAUGE IV STARTED AT 0756   POC TESTING COMPLETED GFR = 105   CREATINE = 0.5    TO CT TABLE @ 0813     CONNECT TO MONITOR  HR 56  /71       NITROGLYCERIN 0.4 MILLIGRAMS SUBLINGUAL GIVEN AT 0814      CALCIUM SCORE COMPLETED AT 0817      INJECTION STARTED AT 0819  HR 60 DURING SCAN PROCEDURE COMPLETE    POST SCAN BP  116/67  HR  60 AT 0822     0835 PT TO HOLDING AREA  VS /67 HR 52  Q 15 MIN X2     0853 /66  HR 53     AVS  PROVIDED      0856 DISCHARGED HOME

## 2024-04-12 NOTE — DISCHARGE INSTRUCTIONS
Computed Tomography Angiography (CTA)  Computed tomography angiography (CTA) is an imaging test. It uses X-rays and computer technology to make detailed pictures of your arteries (blood vessels). Before the test, an X-ray dye (contrast medium) is injected into your vein. The dye makes it easier to see your blood vessels on the X-ray. Pictures are then taken with the CT scanner. A computer turns the images into 2-D and 3-D pictures.      CTA can make 3D images, such as the carotid arteries shown here.     Why CTA is done  CTA may be used to:  Check arteries in your belly, neck, lungs, pelvis, kidneys, or brain.  Look for a ballooning of the blood vessel wall (aneurysm) or a tear (dissection).  Check if a tube (stent) used to keep an artery open is working well.  Find damage to your arteries due to injuries.  Collect details on blood vessels that supply blood to tumors.  Getting ready for your test  Tell your healthcare provider if you:   Have diabetes  Have kidney disease  Are allergic to X-ray dye or other medicines  Are pregnant, think you may be pregnant, or are breastfeeding  Are taking any medicines, herbs, or supplements, including prescription medicines, illegal drugs, and over-the-counter medicines such as aspirin or ibuprofen  Follow any directions you are given for not eating or drinking before the CTA. Follow any other instructions from your healthcare provider.   During your test  You will be asked to remove any hair clips, jewelry, false teeth, or other metal items that could show up on the X-ray.  You will lie down on the scanning table. An IV line will be put in a vein in your arm or hand.  The scanning table will be properly placed. The part of your body being checked will be inside the doughnut-shaped CT scanner.  One image may be taken first to be sure you are in the proper position for the test.  The IV will be hooked up to an automatic injection machine. This controls how often and how fast the  X-ray dye is injected. The injection may continue during part of the exam.  The dye will be put into your vein through the IV line. You may feel warmth through your body when the dye is injected.  You can’t move while the X-rays are being taken. Pillows and foam pads may be used to help you stay still. You will be told to hold your breath for 10 to 25 seconds at a time.  A single scan may take several minutes. You may need more than one scan.    After your test  Drink plenty of fluids to help flush the X-ray dye from your body.  You may eat as soon as you want to.    Possible risks  All procedures have some risks. A CTA has some possible risks. These include:   Problems due to the X-ray dye, such as an allergic reaction or kidney damage  Skin damage from leaking X-ray dye near where the IV was put in  [employer] last reviewed this educational content on 8/1/2022  © 5974-4845 [employer]. All rights reserved. This information is not intended as a substitute for professional medical care. Always follow your healthcare professional's instructions.

## 2024-04-17 ENCOUNTER — TELEPHONE (OUTPATIENT)
Dept: FAMILY MEDICINE CLINIC | Facility: CLINIC | Age: 64
End: 2024-04-17

## 2024-04-17 NOTE — TELEPHONE ENCOUNTER
Patient calling to see if Dr. Pham can also review CT Coronary study that was completed on 4/12/24 as ordered by cardiologist, Dr. Noriega? She received results via [employer] and is concerned. I advised patient results should be reviewed by cardiologist (ordering physician). However patient requesting Dr. Pham review them as well as she would like his input. Patient aware Dr. Pham not in office today. Requested message be routed to him regardless.    Patient is currently in [place], returns on Sunday. Yesterday was walking for 10 mins and had significant amount of dyspnea with exertion along with right sided chest pain. States her dyspnea symptoms, and \"chest heaviness\" remains the same. Has seen cardiologist, and pulmonologist as advised regarding this and no resolution yet. Patient will call Dr. Noriega's office at this time for result review.   Advised patient if dyspnea symptoms worsen, or if any new symptoms occur such as persistent chest pain, dizziness, fever, etc to go to local ER for prompt eval. Patient voiced agreement.

## 2024-04-18 NOTE — TELEPHONE ENCOUNTER
We will go ahead and do a video visit at 9:30 AM on Friday.  I will send her a link at that time so we can discuss the cardiology test that she had through the cardiologist.

## 2024-04-19 ENCOUNTER — TELEPHONE (OUTPATIENT)
Dept: FAMILY MEDICINE CLINIC | Facility: CLINIC | Age: 64
End: 2024-04-19

## 2024-04-19 ENCOUNTER — TELEMEDICINE (OUTPATIENT)
Dept: FAMILY MEDICINE CLINIC | Facility: CLINIC | Age: 64
End: 2024-04-19

## 2024-04-19 DIAGNOSIS — K44.9 HIATAL HERNIA: ICD-10-CM

## 2024-04-19 DIAGNOSIS — R91.8 GROUND GLASS OPACITY PRESENT ON IMAGING OF LUNG: ICD-10-CM

## 2024-04-19 DIAGNOSIS — R05.3 CHRONIC COUGH: ICD-10-CM

## 2024-04-19 DIAGNOSIS — K21.9 GASTROESOPHAGEAL REFLUX DISEASE, UNSPECIFIED WHETHER ESOPHAGITIS PRESENT: ICD-10-CM

## 2024-04-19 DIAGNOSIS — R06.09 DOE (DYSPNEA ON EXERTION): Primary | ICD-10-CM

## 2024-04-19 PROCEDURE — 99214 OFFICE O/P EST MOD 30 MIN: CPT | Performed by: FAMILY MEDICINE

## 2024-04-19 NOTE — TELEPHONE ENCOUNTER
Can we please call pulmonology and see if one of the physicians can get her in sooner than June 26, 2024 as she still gets short of breath with exertion.  She is seeing the physician assistant but would really like to see a physician.  Currently she has an appointment with Dr. Prieto June 26, 2024.

## 2024-04-19 NOTE — PROGRESS NOTES
VIDEO VISIT PROGRESS NOTE (Non-Covid-19)  Todays date: 4/19/2024 8:42 AM        Due to the COVID-19 emergency implementation plan, this patient's visit was converted to a video visit as agreed upon with the patient.    [employer] Video Check-In    Kaitlin Schroeder verbally consents to a Video Check-In service on 04/19/24.  Patient understands and accepts financial responsibility for any deductible, co-insurance and/or co-pays associated with this service.  Patient call triage note reviewed.      I spoke to Kaitlin Schroeder (or patient's family member/partner) by video, verified date of birth, and discussed current concerns.      If patient is a child then of course the parent or guardian will be giving the verbal consent for the video check-in and of course I will be speaking to this person for this visit.  Note, many times the patient however is nearby and I can hear the patient answering questions while the person on the video with me is talking to the patient.    This also goes for family members who would rather speak to me on behalf of their Arabic-speaking (or another foreign language) patient.  The patient will give consent but I will be speaking to the person that would be translating.  Also some of these patients with possible COVID-19 infection or some type of other illness are too ill to be on video and would rather have a designated person speak for them and in that case we will be speaking to that designated person and all parties involved understand the disclaimer that goes along with the consent for the video check-in service as stated above.        History of Present Illness:     Last seen by me on 12/22/2023.    Pt sees Dr. Noriega, cardiologist. States that she vacations in [place] and reports when it is humid and she walks quite a bit she has a hard time breathing.  She states that is feels like \"someone is sitting on my chest\". States that she was wheezing a little bit while walking. Pt also  reports that she has had a cough since December.She has not seen Dr. Prieto, pulmonologist and has only seen his physician assistant's. Pt was told that she has possible acid reflux by pulmonology.     The real reason she wanted to visit was to go over the cardiac tests that her cardiologist ordered.  I reviewed her coronary artery calcium score test which was 122. I reviewed her CT cardiac over read from April 2024  which showed ground-glass nodules previously seen in the right lung measuring up to 1.0 cm are unchanged from January 2024. She is taking atorvastatin 20 mg and she is on Repatha injections for cholesterol. I discussed taking omeprazole 20 mg after she gets back from vacation.  She does not have COPD.    She has no fevers.      Duration of video along with documentation in minutes: 10 minutes and 11 seconds on video with an additional 12 minutes of chart review and documentation        Wt Readings from Last 3 Encounters:   04/12/24 130 lb   03/21/24 133 lb   02/15/24 133 lb       BMI Readings from Last 3 Encounters:   04/12/24 23.78 kg/m²   03/21/24 24.33 kg/m²   02/15/24 24.33 kg/m²       BP Readings from Last 3 Encounters:   04/12/24 148/73   03/21/24 115/74   01/16/24 120/60         Review of Systems   Respiratory:  Positive for cough and shortness of breath.    Cardiovascular:  Negative for chest pain.           Medical History:      Past Medical History:    Anxiety    Cancer (HCC)    breast    Cerebrovascular disease    Colon polyps    repeat CLN in 2026    Essential hypertension    High cholesterol    History of colposcopy    Hyperlipidemia    Lipid screening    Per [facility]    Microvascular ischemia of myocardium    Multiple allergies    Per [facility]:  \"Allergies.\"    Osteoporosis screening    Per [facility]    Silent cerebral infarction (HCC)    Stroke (HCC)       Past Surgical History:   Procedure Laterality Date    Colonoscopy  08/27/2018    Dr Contreras    Colonoscopy N/A 4/19/2023    Procedure:  COLONOSCOPY;  Surgeon: PASQUALE Contreras MD;  Location: Replaced by [facility]    Other surgical history Left 11/2016    lumpectomy     Other surgical history Right 11/2014    breast cyst    Other surgical history Right 11/1988    breast cyst          Current Outpatient Medications   Medication Sig Dispense Refill    Inclisiran Sodium (LEQVIO SC) Inject into the skin every 6 (six) months.      Omeprazole 40 MG Oral Capsule Delayed Release Take 1 capsule (40 mg total) by mouth daily. 30 capsule 0    atorvastatin 40 MG Oral Tab TAKE 1 TABLET BY MOUTH EVERY NIGHT FOR CHOLESTEROL (Patient taking differently: 0.5 tablets (20 mg total). TAKE 1 TABLET BY MOUTH EVERY NIGHT FOR CHOLESTEROL) 90 tablet 3    ALPRAZolam 0.25 MG Oral Tab Take 1 tablet (0.25 mg total) by mouth nightly as needed for Sleep or Anxiety. (Inform patient when ready.) 30 tablet 0    amLODIPine 5 MG Oral Tab Take 1 tablet (5 mg total) by mouth daily. For high blood pressure. 90 tablet 1    diphenhydrAMINE HCl, Sleep, (SIMPLY SLEEP) 25 MG Oral Tab Take 1 tablet (25 mg total) by mouth nightly as needed for Sleep.      aspirin 81 MG Oral Tab EC Take 1 tablet (81 mg total) by mouth daily. 90 tablet 3     Allergies:No Known Allergies         Physical Exam:   Limited examination due to this being a video visit       Patient was speaking in complete sentences, no increased work of breathing and very coherent and alert on the phone.  Alert and oriented x 3. Patient appears well-developed and well-nourished. No distress.  Patient was responding to questions appropriately.  Patient did not sound short of breath.  Patient has a normal mood and affect.  No tachypnea.  She did have an intermittent dry cough.  She was not cyanotic.  She was not winded when she spoke.      Assessment / Plan:      Diagnoses and all orders for this visit:    GALLEGOS (dyspnea on exertion)  I will have my triage nurses see if they can get her in sooner with the pulmonologist instead of June 26, 2024.  Ground  glass opacity present on imaging of lung  Reviewed the test that were done by cardiology with her.  Chronic cough  Chronic cough but this could be due to her hiatal hernia and acid reflux.  She has not been taking the omeprazole that was written for her and I told her to get back on it and see how she does.  She had a upper GI per Dr. Baires which I reviewed showing a small hiatal hernia and some GERD.  She is already seen Dr. Contreras the gastroenterologist.  Hiatal hernia  As above  Gastroesophageal reflux disease, unspecified whether esophagitis present  Continue omeprazole 40 mg            Medical History:         Reviewed Allergies:  No Known Allergies   Reviewed Past Medical History:  Past Medical History:    Anxiety    Cancer (HCC)    breast    Cerebrovascular disease    Colon polyps    repeat CLN in     Essential hypertension    High cholesterol    History of colposcopy    Hyperlipidemia    Lipid screening    Per [facility]    Microvascular ischemia of myocardium    Multiple allergies    Per [facility]:  \"Allergies.\"    Osteoporosis screening    Per [facility]    Silent cerebral infarction (HCC)    Stroke (HCC)      Reviewed Family History:  Family History   Problem Relation Age of Onset    Cancer Maternal Grandmother         breast cancer       Reviewed Social History:  Social History     Socioeconomic History    Marital status:    Tobacco Use    Smoking status: Former     Current packs/day: 0.00     Types: Cigarettes     Quit date: 2011     Years since quittin.3    Smokeless tobacco: Never   Vaping Use    Vaping status: Never Used   Substance and Sexual Activity    Alcohol use: Yes     Alcohol/week: 0.0 standard drinks of alcohol     Comment: Wine, socially    Drug use: No   Other Topics Concern    Caffeine Concern Yes     Comment: (Coffee, Tea) 1 cup daily      Reviewed Current Medications:  Current Outpatient Medications   Medication Sig Dispense Refill    Inclisiran Sodium (LEQVIO SC) Inject  into the skin every 6 (six) months.      Omeprazole 40 MG Oral Capsule Delayed Release Take 1 capsule (40 mg total) by mouth daily. 30 capsule 0    atorvastatin 40 MG Oral Tab TAKE 1 TABLET BY MOUTH EVERY NIGHT FOR CHOLESTEROL (Patient taking differently: 0.5 tablets (20 mg total). TAKE 1 TABLET BY MOUTH EVERY NIGHT FOR CHOLESTEROL) 90 tablet 3    ALPRAZolam 0.25 MG Oral Tab Take 1 tablet (0.25 mg total) by mouth nightly as needed for Sleep or Anxiety. (Inform patient when ready.) 30 tablet 0    amLODIPine 5 MG Oral Tab Take 1 tablet (5 mg total) by mouth daily. For high blood pressure. 90 tablet 1    diphenhydrAMINE HCl, Sleep, (SIMPLY SLEEP) 25 MG Oral Tab Take 1 tablet (25 mg total) by mouth nightly as needed for Sleep.      aspirin 81 MG Oral Tab EC Take 1 tablet (81 mg total) by mouth daily. 90 tablet 3            Kaitlin Schroeder advised to follow CDC guidelines for self isolation and symptomatic treatment as outlined on CDC Patient Guidelines. Kaitlin Schroeder understands video evaluation is not a substitute for face-to-face examination or emergency care. Patient advised to go to ER or call 911 for worsening symptoms or acute distress. (NOTE: Not every complaint above will be related to the COVID-19 pandemic).    Please note that the following visit was completed using two-way, real-time interactive audio and/or video communication.  This has been done in good tnoy to provide continuity of care in the best interest of the provider-patient relationship, due to the ongoing public health crisis/national emergency and because of restrictions of visitation.  There are limitations of this visit as no physical exam could be performed.  Every conscious effort was taken to allow for sufficient and adequate time.  This billing was spent on reviewing labs, medications, radiology tests and decision making.  Appropriate medical decision-making and tests are ordered as detailed in the plan of care above.    Chris  Shayan  4/19/2024     By signing my name below, I, Chris Downey,  attest that this documentation has been prepared under the direction and in the presence of Francisco Pham DO.   Electronically Signed: Chris Downey, 4/19/2024, 8:42 AM.    I, Francisco Pham DO,  personally performed the services described in this documentation. All medical record entries made by the scribe were at my direction and in my presence.  I have reviewed the chart and discharge instructions (if applicable) and agree that the record reflects my personal performance and is accurate and complete.  Francisco Pham DO, 4/19/2024, 10:12 AM

## 2024-04-19 NOTE — TELEPHONE ENCOUNTER
LOV 3/21/24 w/ Sabas Lipscomb PA-C.  Pt scheduled for sleep study 5/6.  PFT done 3/10/24 & CTA Gated Coronary Arteries w/ Calcium Scoring performed 4/12/24.    Requesting to see a physician within pulmonary. Dr. Fay has appt for consult on 5/10/24. However, pt will need f/u appt w/in 31-90 days of starting CPAP if ordered s/p sleep study. Left pt msg to call back.

## 2024-04-23 NOTE — TELEPHONE ENCOUNTER
Per pt she was having chest heaviness, SOB, & ongoing cough. Stts she couldn't breathe in & take a deep breath, but now her sx have improved. Stts she will keep upcoming appt w/ Dr. Prieto & Sabas Lipscomb PA-C. Encouraged her to let us know if we may be of further assistance in the interim. Reassured her. Pt voiced understanding & was agreeable. Confirmed nothing further for pulmo to do at this time.

## 2024-04-24 RX ORDER — OMEPRAZOLE 40 MG/1
40 CAPSULE, DELAYED RELEASE ORAL DAILY
Qty: 90 CAPSULE | Refills: 1 | Status: SHIPPED | OUTPATIENT
Start: 2024-04-24

## 2024-04-24 NOTE — TELEPHONE ENCOUNTER
LOV: 3/21/2024  Last refill: 3/21/2024    Sabas KRISHNA-please review and sign pended prescription if agreeable.

## 2024-05-06 ENCOUNTER — OFFICE VISIT (OUTPATIENT)
Dept: SLEEP CENTER | Age: 64
End: 2024-05-06
Attending: PHYSICIAN ASSISTANT
Payer: COMMERCIAL

## 2024-05-06 DIAGNOSIS — G47.33 OSA (OBSTRUCTIVE SLEEP APNEA): Primary | ICD-10-CM

## 2024-05-06 PROCEDURE — 95810 POLYSOM 6/> YRS 4/> PARAM: CPT

## 2024-05-10 ENCOUNTER — TELEPHONE (OUTPATIENT)
Dept: PULMONOLOGY | Facility: CLINIC | Age: 64
End: 2024-05-10

## 2024-05-10 DIAGNOSIS — G47.33 OSA (OBSTRUCTIVE SLEEP APNEA): Primary | ICD-10-CM

## 2024-05-10 RX ORDER — FLUTICASONE PROPIONATE AND SALMETEROL 250; 50 UG/1; UG/1
1 POWDER RESPIRATORY (INHALATION) EVERY 12 HOURS SCHEDULED
Qty: 1 EACH | Refills: 0 | Status: SHIPPED | OUTPATIENT
Start: 2024-05-10 | End: 2024-05-13

## 2024-05-10 NOTE — TELEPHONE ENCOUNTER
I spoke with the patient. She still has a dry cough which is very bothersome to her. She is taking pantoprazole only as needed. We reviewed common causes of cough. She has symptoms suggestive of acid reflux, allergic rhinitis, and possible asthma.    She had episode of chest tightness and significant shortness of breath while in [place] a few weeks ago. Cardiac work up unrevealing. Symptoms are worse when she is in [place].    She will start omeprazole 40 mg daily for the next 4-6 weeks. She started over the counter antihistamine in the last week. She is agreeable to trial of ICS/LABA. I sent Wixela 250/50 to pharmacy.     Asked patient to call back in 3-4 weeks with update.

## 2024-05-13 RX ORDER — FLUTICASONE PROPIONATE AND SALMETEROL 250; 50 UG/1; UG/1
1 POWDER RESPIRATORY (INHALATION) EVERY 12 HOURS
Qty: 180 EACH | Refills: 0 | Status: SHIPPED | OUTPATIENT
Start: 2024-05-13

## 2024-05-13 NOTE — TELEPHONE ENCOUNTER
Sabas- please sign pended order for fluticasone-salmeterol, if agreeable. 90 day supply is being requested    lov: 3/21/24 Follow up: 6/26/24 Last refill: 5/10/24

## 2024-05-21 ENCOUNTER — TELEPHONE (OUTPATIENT)
Dept: PULMONOLOGY | Facility: CLINIC | Age: 64
End: 2024-05-21

## 2024-05-21 NOTE — TELEPHONE ENCOUNTER
Received fax from [facility] that patient is due for CPAP follow up between 6/21/24 and 8/19/24. Informed patient that they have 2 appointments scheduled with Sabas and Ida, and that only appointment with Sabas is needed

## 2024-06-05 DIAGNOSIS — I10 ESSENTIAL HYPERTENSION: ICD-10-CM

## 2024-06-10 NOTE — TELEPHONE ENCOUNTER
Please review. Protocol Failed; No Protocol    Requested Prescriptions   Pending Prescriptions Disp Refills    AMLODIPINE 5 MG Oral Tab [Pharmacy Med Name: AMLODIPINE BESYLATE 5MG TABLETS] 90 tablet 1     Sig: TAKE 1 TABLET(5 MG) BY MOUTH DAILY FOR HIGH BLOOD PRESSURE       Hypertension Medications Protocol Failed - 6/5/2024 10:08 AM        Failed - Last BP reading less than 140/90     BP Readings from Last 1 Encounters:   04/12/24 148/73               Passed - CMP or BMP in past 12 months        Passed - In person appointment or virtual visit in the past 12 mos or appointment in next 3 mos     Recent Outpatient Visits              1 month ago NEO (obstructive sleep apnea)    [facility]    Office Visit    1 month ago GALLEGOS (dyspnea on exertion)    [facility] Francisco Hebert DO    Telemedicine    2 months ago Chronic cough    [facility] Sabas Lipscomb PA-C    Office Visit    3 months ago Tension headache    [facility] Hunter Younger MD    Office Visit    4 months ago Ground glass opacity present on imaging of lung    [facility] Sabas Lipscomb PA-C    Office Visit          Future Appointments         Provider Department Appt Notes    In 1 week [facility] US [address] Ultrasound - [place] Module found on previous ultrasound.    In 1 week Shahab Baires MD [facility] f/u CT    In 2 weeks Bouchra Prieto MD [facility] pulmonary issues    In 1 month [facility] CT [address] CT - [place]     In 1 month Sabas Lipscomb PA-C [facility] 6 months                    Passed - EGFRCR or GFRNAA > 50     GFR Evaluation  EGFRCR: 105 , resulted on 4/12/2024                 Future  Appointments         Provider Department Appt Notes    In 1 week ADO US [address] Ultrasound - [place] Module found on previous ultrasound.    In 1 week Shahab Baires MD [facility] f/u CT    In 2 weeks Bouchra Prieto MD [facility] pulmonary issues    In 1 month [facility] CT [address] CT - [place]     In 1 month Sabas Lipscomb PA-C [facility] 6 months          Recent Outpatient Visits              1 month ago NEO (obstructive sleep apnea)    [facility]    Office Visit    1 month ago GALLEGOS (dyspnea on exertion)    [place] Francisco Pham DO Telemedicine    2 months ago Chronic cough    [facility] Sabas Lipscomb PA-C    Office Visit    3 months ago Tension headache    [facility] Hunter Younger MD    Office Visit    4 months ago Ground glass opacity present on imaging of lung    [facility] Sabas Lipscomb PA-C    Office Visit           medical evaluation

## 2024-06-12 RX ORDER — AMLODIPINE BESYLATE 5 MG/1
5 TABLET ORAL DAILY
Qty: 90 TABLET | Refills: 3 | Status: SHIPPED | OUTPATIENT
Start: 2024-06-12

## 2024-06-18 ENCOUNTER — HOSPITAL ENCOUNTER (OUTPATIENT)
Dept: ULTRASOUND IMAGING | Age: 64
Discharge: HOME | End: 2024-06-18
Attending: OTOLARYNGOLOGY

## 2024-06-18 DIAGNOSIS — E04.1 THYROID NODULE: ICD-10-CM

## 2024-06-18 PROCEDURE — 76536 US EXAM OF HEAD AND NECK: CPT | Performed by: OTOLARYNGOLOGY

## 2024-06-19 ENCOUNTER — TELEPHONE (OUTPATIENT)
Dept: OTOLARYNGOLOGY | Facility: CLINIC | Age: 64
End: 2024-06-19

## 2024-06-19 DIAGNOSIS — E04.1 THYROID NODULE: Primary | ICD-10-CM

## 2024-06-21 NOTE — TELEPHONE ENCOUNTER
Sabas - please sign pended order for Omeprazole 40 mg, if agreeable. Patient states she forgot prescription in [place]    Last office visit: 5/6/24 Follow up: 7/19/24 Last refill: 4/24/24

## 2024-06-24 RX ORDER — OMEPRAZOLE 40 MG/1
40 CAPSULE, DELAYED RELEASE ORAL DAILY
Qty: 90 CAPSULE | Refills: 1 | Status: SHIPPED | OUTPATIENT
Start: 2024-06-24

## 2024-06-25 ENCOUNTER — OFFICE VISIT (OUTPATIENT)
Dept: OTOLARYNGOLOGY | Facility: CLINIC | Age: 64
End: 2024-06-25

## 2024-06-25 VITALS — WEIGHT: 130 LBS | BODY MASS INDEX: 23.92 KG/M2 | HEIGHT: 62 IN

## 2024-06-25 DIAGNOSIS — K14.8 TONGUE LESION: ICD-10-CM

## 2024-06-25 DIAGNOSIS — E04.1 THYROID NODULE: Primary | ICD-10-CM

## 2024-06-25 PROCEDURE — 3008F BODY MASS INDEX DOCD: CPT | Performed by: OTOLARYNGOLOGY

## 2024-06-25 PROCEDURE — 99213 OFFICE O/P EST LOW 20 MIN: CPT | Performed by: OTOLARYNGOLOGY

## 2024-06-25 RX ORDER — DIPHENHYDRAMINE HCL 12.5MG/5ML
LIQUID (ML) ORAL
Qty: 500 ML | Refills: 0 | Status: SHIPPED | OUTPATIENT
Start: 2024-06-25

## 2024-06-25 RX ORDER — METOPROLOL TARTRATE 50 MG/1
TABLET, FILM COATED ORAL
COMMUNITY
Start: 2024-04-01

## 2024-06-25 NOTE — PROGRESS NOTES
Kaitlin Schroeder is a 64 year old female.    Chief Complaint   Patient presents with    Sore Throat     Patient is here for sore throat x 2 days.    Results     Patient is here for ultrasound thyroid results.       HISTORY OF PRESENT ILLNESS  She presents with a history of undergoing a carotid Doppler screening with the finding of a nodule thyroid on the right.  No measurements were made no information on her results which is essentially just a Doppler scan of the carotid arteries.  Recommended to be seen by me by her primary care physician.  Also complains of worsening swallowing issues saying that food as well as pills get stuck in her throat.  She points to an area of the upper esophagus.  This has been getting worse over time.  Happens essentially every day.  No other signs, symptoms or complaints     12/12/23 here to review her ultrasound of the neck as well as an esophagram.  Earlier this morning had to go to the ER due to elevated blood pressure.  EKG negative MRI negative except finding of aneurysm and she has been scheduled to meet with a neurosurgeon to discuss management options.  He will be starting her blood pressure medication at this point.  Ultrasound demonstrates largest nodule to be 1.8 cm increased from 1.1 cm back in 2011.  Rated a TR 3.  Another nodule has increased from 1.1 cm to 1.4 cm and is also TR 3.  Neither warrants biopsies.  Recommendations by radiologist is repeat ultrasound in 1 year.  Esophagram demonstrates hiatal hernia as well as some duodenitis.  I do suspect that she has reflux.  Still feels like pills and foods get caught in her throat.  No other signs, symptoms or complaint    6/25/24 here to go over the results of her repeat ultrasound.  Ultrasound demonstrates a 1.8 cm TR 3 lesion thought to be unchanged from previous studies.  Does not warrant a biopsy at this time.  Here to discuss further management.  Also complains of an ulcer on her left tongue.      Social History      Socioeconomic History    Marital status:    Tobacco Use    Smoking status: Former     Current packs/day: 0.00     Types: Cigarettes     Quit date: 2011     Years since quittin.4    Smokeless tobacco: Never   Vaping Use    Vaping status: Never Used   Substance and Sexual Activity    Alcohol use: Yes     Alcohol/week: 0.0 standard drinks of alcohol     Comment: Wine, socially    Drug use: No   Other Topics Concern    Caffeine Concern Yes     Comment: (Coffee, Tea) 1 cup daily       Family History   Problem Relation Age of Onset    Cancer Maternal Grandmother         breast cancer       Past Medical History:    Anxiety    Cancer (HCC)    breast    Cerebrovascular disease    Colon polyps    repeat CLN in     Essential hypertension    High cholesterol    History of colposcopy    Hyperlipidemia    Lipid screening    Per [facility]    Microvascular ischemia of myocardium    Multiple allergies    Per [facility]:  \"Allergies.\"    Osteoporosis screening    Per [facility]    Silent cerebral infarction (HCC)    Stroke (HCC)       Past Surgical History:   Procedure Laterality Date    Colonoscopy  2018    Dr Contreras    Colonoscopy N/A 2023    Procedure: COLONOSCOPY;  Surgeon: PASQUALE Contreras MD;  Location: [facility]    Other surgical history Left 2016    lumpectomy     Other surgical history Right 2014    breast cyst    Other surgical history Right 1988    breast cyst         REVIEW OF SYSTEMS    System Neg/Pos Details   Constitutional Negative Fatigue, fever and weight loss.   ENMT Negative Drooling.   Eyes Negative Blurred vision and vision changes.   Respiratory Negative Dyspnea and wheezing.   Cardio Negative Chest pain, irregular heartbeat/palpitations and syncope.   GI Negative Abdominal pain and diarrhea.   Endocrine Negative Cold intolerance and heat intolerance.   Neuro Negative Tremors.   Psych Negative Anxiety and depression.   Integumentary Negative Frequent skin infections, pigment  change and rash.   Hema/Lymph Negative Easy bleeding and easy bruising.           PHYSICAL EXAM    Ht 5' 2\" (1.575 m)   Wt 130 lb (59 kg)   BMI 23.78 kg/m²        Constitutional Normal Overall appearance - Normal.   Psychiatric Normal Orientation - Oriented to time, place, person & situation. Appropriate mood and affect.   Neck Exam Normal Inspection - Normal. Palpation - Normal. Parotid gland - Normal. Thyroid gland - Normal.   Eyes Normal Conjunctiva - Right: Normal, Left: Normal. Pupil - Right: Normal, Left: Normal. Fundus - Right: Normal, Left: Normal.   Neurological Normal Memory - Normal. Cranial nerves - Cranial nerves II through XII grossly intact.   Head/Face Normal Facial features - Normal. Eyebrows - Normal. Skull - Normal.        Nasopharynx Normal External nose - Normal. Lips/teeth/gums - Normal. Tonsils - Normal. Oropharynx - Normal.   Ears Normal Inspection - Right: Normal, Left: Normal. Canal - Right: Normal, Left: Normal. TM - Right: Normal, Left: Normal.   Skin Normal Inspection - Normal.        Lymph Detail Normal Submental. Submandibular. Anterior cervical. Posterior cervical. Supraclavicular.   Tongue    Subcentimeter erosion lateral tongue on the left near a posterior mandibular molar.   Nose/Mouth/Throat Normal External nose - Normal. Lips/teeth/gums - Normal. Tonsils - Normal. Oropharynx - Normal.   Nose/Mouth/Throat Normal Nares - Right: Normal Left: Normal. Septum -Normal  Turbinates - Right: Normal, Left: Normal.       Current Outpatient Medications:     metoprolol tartrate 50 MG Oral Tab, , Disp: , Rfl:     diphenhydrAMINE 12.5 MG/5ML Oral Elixir, Please crush 8 tablets of hydrocortisone 20 mg and mix in 500 cc of Benadryl elixir. 5 cc swish and swallow q.6 hours, Disp: 500 mL, Rfl: 0    Omeprazole 40 MG Oral Capsule Delayed Release, Take 1 capsule (40 mg total) by mouth daily., Disp: 90 capsule, Rfl: 1    amLODIPine 5 MG Oral Tab, Take 1 tablet (5 mg total) by mouth daily., Disp: 90  tablet, Rfl: 3    fluticasone-salmeterol 250-50 MCG/ACT Inhalation Aerosol Powder, Breath Activated, Inhale 1 puff into the lungs Q12H. RINSE MOUTH GARGLE & SPIT AFTER USE, Disp: 180 each, Rfl: 0    Inclisiran Sodium (LEQVIO SC), Inject into the skin every 6 (six) months., Disp: , Rfl:     atorvastatin 40 MG Oral Tab, TAKE 1 TABLET BY MOUTH EVERY NIGHT FOR CHOLESTEROL (Patient taking differently: 0.5 tablets (20 mg total). TAKE 1 TABLET BY MOUTH EVERY NIGHT FOR CHOLESTEROL), Disp: 90 tablet, Rfl: 3    ALPRAZolam 0.25 MG Oral Tab, Take 1 tablet (0.25 mg total) by mouth nightly as needed for Sleep or Anxiety. (Inform patient when ready.), Disp: 30 tablet, Rfl: 0    diphenhydrAMINE HCl, Sleep, (SIMPLY SLEEP) 25 MG Oral Tab, Take 1 tablet (25 mg total) by mouth nightly as needed for Sleep., Disp: , Rfl:     aspirin 81 MG Oral Tab EC, Take 1 tablet (81 mg total) by mouth daily., Disp: 90 tablet, Rfl: 3  ASSESSMENT AND PLAN    1. Thyroid nodule  Thyroid ultrasound reviewed no change in the last 6 months.  We discussed that despite the fact that the nodule has changed over the last 10 years at this time it does not meet the criteria for biopsy with ultrasound and I had did recommend that we simply repeat an ultrasound in a year to watch for any interval change.  She agrees with this plan.    2. Tongue lesion  Tongue lesion appears to be an ulcer laterally near all molar on the mandible.  Start Benadryl/hydrocortisone elixir.  Return to see me as needed if no improvement        This note was prepared using Dragon Medical voice recognition dictation software. As a result errors may occur. When identified these errors have been corrected. While every attempt is made to correct errors during dictation discrepancies may still exist    Shahab Baires MD    6/25/2024    11:34 AM

## 2024-07-16 ENCOUNTER — HOSPITAL ENCOUNTER (OUTPATIENT)
Dept: CT IMAGING | Age: 64
Discharge: HOME | End: 2024-07-16
Attending: PHYSICIAN ASSISTANT
Payer: COMMERCIAL

## 2024-07-16 DIAGNOSIS — R91.8 PULMONARY NODULES: ICD-10-CM

## 2024-07-16 DIAGNOSIS — R91.8 GROUND GLASS OPACITY PRESENT ON IMAGING OF LUNG: ICD-10-CM

## 2024-07-16 PROCEDURE — 71250 CT THORAX DX C-: CPT | Performed by: PHYSICIAN ASSISTANT

## 2024-07-19 ENCOUNTER — OFFICE VISIT (OUTPATIENT)
Dept: PULMONOLOGY | Facility: CLINIC | Age: 64
End: 2024-07-19
Payer: COMMERCIAL

## 2024-07-19 VITALS
WEIGHT: 134 LBS | SYSTOLIC BLOOD PRESSURE: 114 MMHG | HEART RATE: 90 BPM | BODY MASS INDEX: 24.66 KG/M2 | HEIGHT: 62 IN | OXYGEN SATURATION: 97 % | DIASTOLIC BLOOD PRESSURE: 62 MMHG

## 2024-07-19 DIAGNOSIS — R91.8 GROUND GLASS OPACITY PRESENT ON IMAGING OF LUNG: ICD-10-CM

## 2024-07-19 DIAGNOSIS — K21.9 GASTROESOPHAGEAL REFLUX DISEASE, UNSPECIFIED WHETHER ESOPHAGITIS PRESENT: ICD-10-CM

## 2024-07-19 DIAGNOSIS — R06.09 DYSPNEA ON EXERTION: ICD-10-CM

## 2024-07-19 DIAGNOSIS — G47.33 OSA (OBSTRUCTIVE SLEEP APNEA): Primary | ICD-10-CM

## 2024-07-19 PROCEDURE — 99214 OFFICE O/P EST MOD 30 MIN: CPT | Performed by: PHYSICIAN ASSISTANT

## 2024-07-19 PROCEDURE — 3008F BODY MASS INDEX DOCD: CPT | Performed by: PHYSICIAN ASSISTANT

## 2024-07-19 PROCEDURE — 3074F SYST BP LT 130 MM HG: CPT | Performed by: PHYSICIAN ASSISTANT

## 2024-07-19 PROCEDURE — 3078F DIAST BP <80 MM HG: CPT | Performed by: PHYSICIAN ASSISTANT

## 2024-07-19 RX ORDER — NICOTINE POLACRILEX 4 MG/1
20 GUM, CHEWING ORAL EVERY MORNING
Qty: 30 TABLET | Refills: 0 | Status: SHIPPED | OUTPATIENT
Start: 2024-07-19 | End: 2024-08-18

## 2024-07-19 RX ORDER — ALBUTEROL SULFATE 90 UG/1
AEROSOL, METERED RESPIRATORY (INHALATION)
Qty: 1 EACH | Refills: 0 | Status: SHIPPED | OUTPATIENT
Start: 2024-07-19

## 2024-07-19 NOTE — PROGRESS NOTES
Pulmonary Progress Note    History of Present Illness:  Kaitlin Schroeder is a 64 year old female presenting to pulmonary clinic today for follow-up.    Home sleep study 5/2024 demonstrated mild NEO with combined AHI 10. She was started on CPAP but did not tolerate and returned the machine. She is interested in mandibular advancement therapy.    She was started on omeprazole 40 mg daily for acid reflux at appointment in 3/2024 as she reported chronic cough and frequent retrosternal burning pain. Symptoms resolved with PPI. She has also changed diet to reduce red wine, coffee, and red sauces. She had episode of shortness of breath and chest tightness while in [place] and again while [place] the airport running [place] last month. She walks up to a mile and tolerates well as long as she paces herself. However, she does not exercise other than walking due to her breathing. No wheezing. No chest pain or pleurisy. She was prescribed trial of Wixela but did not try it. Pulmonary function testing was normal. She had follow up CT chest for ground glass opacities which are stable. CT chest showed evidence of small airways disease and mucous plugging.    Social History: , has 2 kids, works part-time as   -Tobacco: Former smoker, quit 13 years ago at age 50 after having smoked on and off for 0.5 ppd for 20 years  -Alcohol: Occasional  -Pets: None  -Exposure: None    Review of Systems:   Constitutional: No weight loss or weight gain.  HEENT: No congestion or postnasal drip.  Cardio: No chest pain.  Respiratory: See HPI.  GI: Acid reflux resolved with PPI.  Extremities: No lower extremity swelling or pain.   Neurologic: No headache.  Psych: No depression.     Physical Exam:  /62   Pulse 90   Ht 5' 2\" (1.575 m)   Wt 134 lb (60.8 kg)   SpO2 97%   BMI 24.51 kg/m²    Constitutional: No acute distress.  HEENT: Head NC/AT. PEERL. No tonsillar or uvula enlargement.   Cardio: Regular rate and rhythm. Normal S1  and S2. No murmurs.   Respiratory: Thorax symmetrical with no labored breathing. Clear to ausculation bilaterally with symmetrical breath sounds. No wheezing, rhonchi, or crackles.   Extremities: No clubbing or cyanosis. No LE edema.  Neurologic: A&Ox3. No gross motor deficits.  Skin: Warm, dry.  Psych: Pleasant affect. Cooperative.    Results:  -UGI esophagram 11/2023: Small sliding hiatal hernia with mild gastroesophageal reflux.     -CT chest 1/2024: Distal endobronchial impaction and reticulonodular opacities throughout right middle lobe and lingula. Findings suggest small airways infection/bronchiolitis. Scattered bilateral solid and ground-glass nodules, largest in RUL measuring 1.1 cm.     -Echo 2/2024: LVEF 65%. Normal LV diastolic function. Moderate tricuspid regurgitation. PASP mildly elevated, estimated at 41 mm Hg.     -Nuclear stress test 2/2024: Stress EKG abnormal due to 1 mm ST depression. Functional capacity is good. Rare PACs. Rare PVCs. Normal perfusion study. LVEF 83% and LV global function is hyperkinetic. Scan is suggestive of low risk for future cardiovascular events.     -PFTs 3/2024: Normal. No significant bronchodilator response. FEV1 2.23 L (99% pred), FEV1/FVC 71%, %, DLCO 89%.    -Polysomnography 5/2024: AHI 10. Mild PLMs without arousal.    -CT chest 7/2024: Stable 11 mm ground glass opacity in right upper lobe and stable 9 mm ground glass opacity in right middle lobe. Small airways disease with scattered areas of mucous plugging, most noticeable in right middle lobe.    Assessment/Plan:  Mild NEO   Did not tolerate CPAP. Wants to try mandibular advancement therapy.  Plan:  -Referral to [facility]    Chronic cough  Likely due to GERD. Resolved with PPI.  Plan:  -Reduce omeprazole to 20 mg daily  -Patient to let me know how she is doing in 1 month    Dyspnea on exertion, chest tightness  Intermittent symptoms. PFTs normal. CT chest with signs of small airways  disease. Discussed trial of ICS/LABA vs as needed albuterol.  Plan:  -Trial of albuterol 2 puffs as needed  -Patient to let me know if requiring albuterol >2x/week, then would consider ICS/LABA    Ground-glass opacities  CT chest 7/2024 with stable. Reviewed with Dr. Prieto, agrees with recommendation for 1 year follow up CT chest.  Plan:  -Follow up CT chest in 1 year (due 7/2025) - reminder placed    Sabas Lipscomb PA-C  Pulmonary Medicine  7/19/2024

## 2024-07-19 NOTE — PATIENT INSTRUCTIONS
[facility] - [phone].    Continue omeprazole daily but reduce to 20 mg daily. Please send me a [employer] message or call in 1 month to let me know how you are doing with this.    We will try albuterol inhaler 2 puffs every 6 hours as needed for shortness of breath and chest tightness. If you are using albuterol more than 2 times per week, please let me know as we would then likely start Wixela inhaler.    We will repeat CT scan of the chest in 1 year (July 2025). All CT findings are stable.

## 2024-07-19 NOTE — PROGRESS NOTES
[facility] order, demographics face sheet, base line sleep study report and chart notes faxed to [facility] at fax#[phone] with confirmation.

## 2024-09-09 ENCOUNTER — TELEPHONE (OUTPATIENT)
Dept: FAMILY MEDICINE CLINIC | Facility: CLINIC | Age: 64
End: 2024-09-09

## 2024-09-16 DIAGNOSIS — E78.2 MIXED HYPERLIPIDEMIA: ICD-10-CM

## 2024-09-16 NOTE — TELEPHONE ENCOUNTER
Refill Request:    Current Outpatient Medications   Medication Sig Dispense Refill    atorvastatin 40 MG Oral Tab TAKE 1 TABLET BY MOUTH EVERY NIGHT FOR CHOLESTEROL (Patient taking differently: 0.5 tablets (20 mg total). TAKE 1 TABLET BY MOUTH EVERY NIGHT FOR CHOLESTEROL) 90 tablet 3       Please advise

## 2024-09-19 ENCOUNTER — LAB ENCOUNTER (OUTPATIENT)
Dept: LAB | Age: 64
End: 2024-09-19
Attending: FAMILY MEDICINE
Payer: COMMERCIAL

## 2024-09-19 ENCOUNTER — OFFICE VISIT (OUTPATIENT)
Dept: FAMILY MEDICINE CLINIC | Facility: CLINIC | Age: 64
End: 2024-09-19
Payer: COMMERCIAL

## 2024-09-19 VITALS
SYSTOLIC BLOOD PRESSURE: 135 MMHG | WEIGHT: 135 LBS | HEART RATE: 69 BPM | TEMPERATURE: 97 F | HEIGHT: 62 IN | DIASTOLIC BLOOD PRESSURE: 82 MMHG | BODY MASS INDEX: 24.84 KG/M2

## 2024-09-19 DIAGNOSIS — E78.2 MIXED HYPERLIPIDEMIA: ICD-10-CM

## 2024-09-19 DIAGNOSIS — G47.33 MILD OBSTRUCTIVE SLEEP APNEA: ICD-10-CM

## 2024-09-19 DIAGNOSIS — F41.9 ANXIETY: ICD-10-CM

## 2024-09-19 DIAGNOSIS — I10 ESSENTIAL HYPERTENSION: ICD-10-CM

## 2024-09-19 DIAGNOSIS — J39.8 TRACHEA, STENOSIS: Primary | ICD-10-CM

## 2024-09-19 LAB
ALBUMIN SERPL-MCNC: 4.8 G/DL (ref 3.2–4.8)
ALBUMIN/GLOB SERPL: 1.7 {RATIO} (ref 1–2)
ALP LIVER SERPL-CCNC: 79 U/L
ALT SERPL-CCNC: 30 U/L
ANION GAP SERPL CALC-SCNC: 8 MMOL/L (ref 0–18)
AST SERPL-CCNC: 35 U/L (ref ?–34)
BASOPHILS # BLD AUTO: 0.04 X10(3) UL (ref 0–0.2)
BASOPHILS NFR BLD AUTO: 0.7 %
BILIRUB SERPL-MCNC: 0.7 MG/DL (ref 0.2–1.1)
BUN BLD-MCNC: 12 MG/DL (ref 9–23)
BUN/CREAT SERPL: 16.7 (ref 10–20)
CALCIUM BLD-MCNC: 9.9 MG/DL (ref 8.7–10.4)
CHLORIDE SERPL-SCNC: 107 MMOL/L (ref 98–112)
CHOLEST SERPL-MCNC: 184 MG/DL (ref ?–200)
CO2 SERPL-SCNC: 28 MMOL/L (ref 21–32)
CREAT BLD-MCNC: 0.72 MG/DL
DEPRECATED RDW RBC AUTO: 46.5 FL (ref 35.1–46.3)
EGFRCR SERPLBLD CKD-EPI 2021: 93 ML/MIN/1.73M2 (ref 60–?)
EOSINOPHIL # BLD AUTO: 0.18 X10(3) UL (ref 0–0.7)
EOSINOPHIL NFR BLD AUTO: 3 %
ERYTHROCYTE [DISTWIDTH] IN BLOOD BY AUTOMATED COUNT: 13.4 % (ref 11–15)
FASTING PATIENT LIPID ANSWER: YES
FASTING STATUS PATIENT QL REPORTED: YES
GLOBULIN PLAS-MCNC: 2.8 G/DL (ref 2–3.5)
GLUCOSE BLD-MCNC: 100 MG/DL (ref 70–99)
HCT VFR BLD AUTO: 45.6 %
HDLC SERPL-MCNC: 75 MG/DL (ref 40–59)
HGB BLD-MCNC: 15.2 G/DL
IMM GRANULOCYTES # BLD AUTO: 0.01 X10(3) UL (ref 0–1)
IMM GRANULOCYTES NFR BLD: 0.2 %
LDLC SERPL CALC-MCNC: 86 MG/DL (ref ?–100)
LYMPHOCYTES # BLD AUTO: 1.87 X10(3) UL (ref 1–4)
LYMPHOCYTES NFR BLD AUTO: 31.5 %
MCH RBC QN AUTO: 31.4 PG (ref 26–34)
MCHC RBC AUTO-ENTMCNC: 33.3 G/DL (ref 31–37)
MCV RBC AUTO: 94.2 FL
MONOCYTES # BLD AUTO: 0.49 X10(3) UL (ref 0.1–1)
MONOCYTES NFR BLD AUTO: 8.2 %
NEUTROPHILS # BLD AUTO: 3.35 X10 (3) UL (ref 1.5–7.7)
NEUTROPHILS # BLD AUTO: 3.35 X10(3) UL (ref 1.5–7.7)
NEUTROPHILS NFR BLD AUTO: 56.4 %
NONHDLC SERPL-MCNC: 109 MG/DL (ref ?–130)
OSMOLALITY SERPL CALC.SUM OF ELEC: 296 MOSM/KG (ref 275–295)
PLATELET # BLD AUTO: 315 10(3)UL (ref 150–450)
POTASSIUM SERPL-SCNC: 4.8 MMOL/L (ref 3.5–5.1)
PROT SERPL-MCNC: 7.6 G/DL (ref 5.7–8.2)
RBC # BLD AUTO: 4.84 X10(6)UL
SODIUM SERPL-SCNC: 143 MMOL/L (ref 136–145)
TRIGL SERPL-MCNC: 133 MG/DL (ref 30–149)
TSI SER-ACNC: 0.5 MIU/ML (ref 0.55–4.78)
VLDLC SERPL CALC-MCNC: 21 MG/DL (ref 0–30)
WBC # BLD AUTO: 5.9 X10(3) UL (ref 4–11)

## 2024-09-19 PROCEDURE — 80061 LIPID PANEL: CPT

## 2024-09-19 PROCEDURE — 36415 COLL VENOUS BLD VENIPUNCTURE: CPT

## 2024-09-19 PROCEDURE — 84439 ASSAY OF FREE THYROXINE: CPT | Performed by: FAMILY MEDICINE

## 2024-09-19 PROCEDURE — 84443 ASSAY THYROID STIM HORMONE: CPT

## 2024-09-19 PROCEDURE — G2211 COMPLEX E/M VISIT ADD ON: HCPCS | Performed by: FAMILY MEDICINE

## 2024-09-19 PROCEDURE — 3079F DIAST BP 80-89 MM HG: CPT | Performed by: FAMILY MEDICINE

## 2024-09-19 PROCEDURE — 80053 COMPREHEN METABOLIC PANEL: CPT

## 2024-09-19 PROCEDURE — 85025 COMPLETE CBC W/AUTO DIFF WBC: CPT

## 2024-09-19 PROCEDURE — 99215 OFFICE O/P EST HI 40 MIN: CPT | Performed by: FAMILY MEDICINE

## 2024-09-19 PROCEDURE — 3008F BODY MASS INDEX DOCD: CPT | Performed by: FAMILY MEDICINE

## 2024-09-19 PROCEDURE — 3075F SYST BP GE 130 - 139MM HG: CPT | Performed by: FAMILY MEDICINE

## 2024-09-19 RX ORDER — ALPRAZOLAM 0.25 MG
0.25 TABLET ORAL NIGHTLY PRN
Qty: 30 TABLET | Refills: 0 | Status: SHIPPED | OUTPATIENT
Start: 2024-09-19

## 2024-09-19 NOTE — PROGRESS NOTES
Patient ID: Kaitlin Schroeder is a 64 year old female.    HPI  Chief Complaint   Patient presents with    Test Results       Discuss sleep center results       She did not tolerate CPAP and wanted to try mandibular advancement therapy.  She was referred to [facility] sleep center by pulmonary..  She tried the CPAP for 30 days but just could not tolerate it.  They have made molds for her mouth to help her sleep.    She stated there was an abnormality in her airway per radiographs that were done at [facility] and she was told to follow-up with her PCP or specialist.  I reviewed the past CAT scan from July but that was ordered by pulmonary and she states she is already aware of those stable pulmonary nodules.    August 13, 2024 she had radiographs done at the [facility] and impression states with regard to the airway: The minimum cross-sectional dimensions is 55 mm² and the volume is 7 cc.  The accepted minimal cross-sectional airway dimension is approximately 120 mm².  The dimension recorded in the scan is subnormal and may contribute to effective breathing disorder such as sleep apnea related comorbidities recommended correlating with patient's sleep history and clinical findings.    She states she also needs labs done.  She sees Dr. Noriega for cardiology.  He had placed her on Leqvio and on July 2 she had her second injection.  This injection is given every 6 months.  She is not having any chest pain or trouble breathing.      Wt Readings from Last 6 Encounters:   09/19/24 135 lb (61.2 kg)   07/19/24 134 lb (60.8 kg)   06/25/24 130 lb (59 kg)   04/12/24 130 lb (59 kg)   03/21/24 133 lb (60.3 kg)   02/15/24 133 lb (60.3 kg)       BMI Readings from Last 6 Encounters:   09/19/24 24.69 kg/m²   07/19/24 24.51 kg/m²   06/25/24 23.78 kg/m²   04/12/24 23.78 kg/m²   03/21/24 24.33 kg/m²   02/15/24 24.33 kg/m²       BP Readings from Last 6 Encounters:   09/19/24 146/78   07/19/24 114/62   04/12/24 148/73    03/21/24 115/74   01/16/24 120/60   12/22/23 120/73         Review of Systems  See HPI      Medical History:      Past Medical History:    Anxiety    Cancer (HCC)    breast    Cerebrovascular disease    Colon polyps    repeat CLN in 2026    Essential hypertension    High cholesterol    History of colposcopy    Hyperlipidemia    Lipid screening    Per [employer]    Microvascular ischemia of myocardium    Multiple allergies    Per [employer]:  \"Allergies.\"    Osteoporosis screening    Per [employer]    Silent cerebral infarction (HCC)    Stroke (HCC)       Past Surgical History:   Procedure Laterality Date    Colonoscopy  08/27/2018    Dr Contreras    Colonoscopy N/A 4/19/2023    Procedure: COLONOSCOPY;  Surgeon: PASQUALE Contreras MD;  Location: [facility]    Other surgical history Left 11/2016    lumpectomy     Other surgical history Right 11/2014    breast cyst    Other surgical history Right 11/1988    breast cyst          Current Outpatient Medications   Medication Sig Dispense Refill    albuterol 108 (90 Base) MCG/ACT Inhalation Aero Soln Inhale 2 puffs by inhalation route every 6 hours as needed for shortness of breath or wheezing. 1 each 0    metoprolol tartrate 50 MG Oral Tab       amLODIPine 5 MG Oral Tab Take 1 tablet (5 mg total) by mouth daily. 90 tablet 3    Inclisiran Sodium (LEQVIO SC) Inject into the skin every 6 (six) months.      ALPRAZolam 0.25 MG Oral Tab Take 1 tablet (0.25 mg total) by mouth nightly as needed for Sleep or Anxiety. (Inform patient when ready.) 30 tablet 0    diphenhydrAMINE HCl, Sleep, (SIMPLY SLEEP) 25 MG Oral Tab Take 1 tablet (25 mg total) by mouth nightly as needed for Sleep.      aspirin 81 MG Oral Tab EC Take 1 tablet (81 mg total) by mouth daily. 90 tablet 3     Allergies:No Known Allergies     Physical Exam:       Physical Exam  Blood pressure 146/78, pulse 69, temperature 97.2 °F (36.2 °C), temperature source Temporal, height 5' 2\" (1.575 m), weight 135 lb (61.2 kg), not currently  breastfeeding.  Physical Exam   Constitutional: Patient is oriented to person, place, and time. Patient appears well-developed and well-nourished. No distress.   HENT:   Head: Normocephalic and atraumatic.   Neck: Normal range of motion. Neck supple. No thyromegaly present.   Lymphadenopathy:     Has  no cervical adenopathy.   Cardiovascular: Normal rate, regular rhythm and no murmur heard.  Pulmonary/Chest: Effort normal and breath sounds normal. No respiratory distress.  Speaking clearly.  There is no wheezing or stridor.  Neurological: Patient is alert and oriented to person, place, and time.   Skin: Skin is warm and dry.   Psychiatric: Patient has a normal mood and affect.   Vitals:    09/19/24 1036 09/19/24 1112   BP: 146/78 135/82   Pulse: 69    Temp: 97.2 °F (36.2 °C)    TempSrc: Temporal    Weight: 135 lb (61.2 kg)    Height: 5' 2\" (1.575 m)        Nursing note and vitals reviewed.           Assessment/Plan:      Diagnoses and all orders for this visit:    Trachea, stenosis  -     ENT - INTERNAL  I will have her see ENT to see if they can do a laryngoscope in the office and see if they can find the abnormality.  Otherwise she needs to see pulmonary.  She understands.    I was able to review this report on her cell phone as it was emailed to her and I was able to show her the area of the trachea that looks to be decreased in size compared to the rest of it.  Anxiety  -     ALPRAZolam 0.25 MG Oral Tab; Take 1 tablet (0.25 mg total) by mouth nightly as needed for Sleep or Anxiety. (Inform patient when ready.)  Alprazolam refilled  Mild obstructive sleep apnea  -     ENT - INTERNAL  She could not tolerate the CPAP machine.  Essential hypertension  -     CBC With Differential With Platelet; Future  -     Comp Metabolic Panel (14); Future  Controlled, continue present management  Mixed hyperlipidemia  -     Lipid Panel; Future  -     Assay, Thyroid Stim Hormone; Future  We need to see if the cholesterol is  controlled with the Leqvio.      Referrals (if applicable)  Orders Placed This Encounter   Procedures    ENT - INTERNAL     Referral Priority:   Routine     Referral Type:   OFFICE VISIT     Referred to Provider:   Shahab Baires MD     Requested Specialty:   OTOLARYNGOLOGY     Number of Visits Requested:   2         Follow up if symptoms persist.  Take medicine (if given) as prescribed.  Approach to treatment discussed and patient/family member understands and agrees to plan.     No follow-ups on file.        Francisco Pham DO  9/19/2024

## 2024-09-20 RX ORDER — ATORVASTATIN CALCIUM 40 MG/1
TABLET, FILM COATED ORAL
Qty: 90 TABLET | Refills: 3 | OUTPATIENT
Start: 2024-09-20

## 2024-09-21 DIAGNOSIS — R79.89 DECREASED THYROID STIMULATING HORMONE (TSH) LEVEL: Primary | ICD-10-CM

## 2024-09-21 LAB — T4 FREE SERPL-MCNC: 1.4 NG/DL (ref 0.8–1.7)

## 2024-09-24 ENCOUNTER — OFFICE VISIT (OUTPATIENT)
Dept: OTOLARYNGOLOGY | Facility: CLINIC | Age: 64
End: 2024-09-24
Payer: COMMERCIAL

## 2024-09-24 ENCOUNTER — PATIENT MESSAGE (OUTPATIENT)
Dept: FAMILY MEDICINE CLINIC | Facility: CLINIC | Age: 64
End: 2024-09-24

## 2024-09-24 DIAGNOSIS — G47.33 OSA (OBSTRUCTIVE SLEEP APNEA): Primary | ICD-10-CM

## 2024-09-24 PROCEDURE — 99213 OFFICE O/P EST LOW 20 MIN: CPT | Performed by: OTOLARYNGOLOGY

## 2024-09-24 RX ORDER — OMEPRAZOLE 40 MG/1
CAPSULE, DELAYED RELEASE ORAL
COMMUNITY
Start: 2024-09-21

## 2024-09-24 NOTE — PROGRESS NOTES
Kaitlin Schroeder is a 64 year old female.    Chief Complaint   Patient presents with    Throat Problem     Patient is here due to being referred to by pcp due to abnormal finding on images. Reports some  issues when swallowing.        HISTORY OF PRESENT ILLNESS  She presents with a history of undergoing a carotid Doppler screening with the finding of a nodule thyroid on the right.  No measurements were made no information on her results which is essentially just a Doppler scan of the carotid arteries.  Recommended to be seen by me by her primary care physician.  Also complains of worsening swallowing issues saying that food as well as pills get stuck in her throat.  She points to an area of the upper esophagus.  This has been getting worse over time.  Happens essentially every day.  No other signs, symptoms or complaints     12/12/23 here to review her ultrasound of the neck as well as an esophagram.  Earlier this morning had to go to the ER due to elevated blood pressure.  EKG negative MRI negative except finding of aneurysm and she has been scheduled to meet with a neurosurgeon to discuss management options.  He will be starting her blood pressure medication at this point.  Ultrasound demonstrates largest nodule to be 1.8 cm increased from 1.1 cm back in 2011.  Rated a TR 3.  Another nodule has increased from 1.1 cm to 1.4 cm and is also TR 3.  Neither warrants biopsies.  Recommendations by radiologist is repeat ultrasound in 1 year.  Esophagram demonstrates hiatal hernia as well as some duodenitis.  I do suspect that she has reflux.  Still feels like pills and foods get caught in her throat.  No other signs, symptoms or complaint     6/25/24 here to go over the results of her repeat ultrasound.  Ultrasound demonstrates a 1.8 cm TR 3 lesion thought to be unchanged from previous studies.  Does not warrant a biopsy at this time.  Here to discuss further management.  Also complains of an ulcer on her left tongue.      24 she presents with a recent history of undergoing some orthognathic evaluation for an oral appliance for her sleep apnea.  Known mild obstructive sleep apnea with an apnea-hypopnea index of 11.  Oxygen ansley of 91%.  REM AHI 1.9.  Trialed CPAP was incapable of tolerating its use.  Recently seen by a sleep dentist with analysis of her airway demonstrating a narrowed at the level of the nasopharynx going into the oropharynx.  Now has an oral appliance which she has used there for about 4 days and will be advancing the appliance over time too early to tell if it is causing her any to and her sleep apnea.  Discussed the findings that study.       Social History     Socioeconomic History    Marital status:    Tobacco Use    Smoking status: Former     Current packs/day: 0.00     Types: Cigarettes     Quit date: 2011     Years since quittin.7    Smokeless tobacco: Never   Vaping Use    Vaping status: Never Used   Substance and Sexual Activity    Alcohol use: Yes     Alcohol/week: 0.0 standard drinks of alcohol     Comment: Wine, socially    Drug use: No   Other Topics Concern    Caffeine Concern Yes     Comment: (Coffee, Tea) 1 cup daily       Family History   Problem Relation Age of Onset    Cancer Maternal Grandmother         breast cancer       Past Medical History:    Anxiety    Cancer (HCC)    breast    Cerebrovascular disease    Colon polyps    repeat CLN in     Essential hypertension    High cholesterol    History of colposcopy    Hyperlipidemia    Lipid screening    Per [employer]    Microvascular ischemia of myocardium    Multiple allergies    Per [employer]:  \"Allergies.\"    Osteoporosis screening    Per [employer]    Silent cerebral infarction (HCC)    Stroke (HCC)       Past Surgical History:   Procedure Laterality Date    Colonoscopy  2018    Dr Contreras    Colonoscopy N/A 2023    Procedure: COLONOSCOPY;  Surgeon: PASQUALE Contreras MD;  Location: [facility]    Other surgical history  Left 11/2016    lumpectomy     Other surgical history Right 11/2014    breast cyst    Other surgical history Right 11/1988    breast cyst         REVIEW OF SYSTEMS    System Neg/Pos Details   Constitutional Negative Fatigue, fever and weight loss.   ENMT Negative Drooling.   Eyes Negative Blurred vision and vision changes.   Respiratory Negative Dyspnea and wheezing.   Cardio Negative Chest pain, irregular heartbeat/palpitations and syncope.   GI Negative Abdominal pain and diarrhea.   Endocrine Negative Cold intolerance and heat intolerance.   Neuro Negative Tremors.   Psych Negative Anxiety and depression.   Integumentary Negative Frequent skin infections, pigment change and rash.   Hema/Lymph Negative Easy bleeding and easy bruising.           PHYSICAL EXAM    There were no vitals taken for this visit.       Constitutional Normal Overall appearance - Normal.   Psychiatric Normal Orientation - Oriented to time, place, person & situation. Appropriate mood and affect.   Neck Exam Normal Inspection - Normal. Palpation - Normal. Parotid gland - Normal. Thyroid gland - Normal.   Eyes Normal Conjunctiva - Right: Normal, Left: Normal. Pupil - Right: Normal, Left: Normal. Fundus - Right: Normal, Left: Normal.   Neurological Normal Memory - Normal. Cranial nerves - Cranial nerves II through XII grossly intact.   Head/Face Normal Facial features - Normal. Eyebrows - Normal. Skull - Normal.        Nasopharynx Normal External nose - Normal. Lips/teeth/gums - Normal. Tonsils - Normal. Oropharynx - Normal.   Ears Normal Inspection - Right: Normal, Left: Normal. Canal - Right: Normal, Left: Normal. TM - Right: Normal, Left: Normal.   Skin Normal Inspection - Normal.        Lymph Detail Normal Submental. Submandibular. Anterior cervical. Posterior cervical. Supraclavicular.        Nose/Mouth/Throat Normal External nose - Normal. Lips/teeth/gums - Normal. Tonsils - Normal. Oropharynx - Normal.  Large tongue base obstructing the  oropharyngeal airway.   Nose/Mouth/Throat Normal Nares - Right: Normal Left: Normal. Septum -Normal  Turbinates - Right: Normal, Left: Normal.       Current Outpatient Medications:     Omeprazole 40 MG Oral Capsule Delayed Release, , Disp: , Rfl:     ALPRAZolam 0.25 MG Oral Tab, Take 1 tablet (0.25 mg total) by mouth nightly as needed for Sleep or Anxiety. (Inform patient when ready.), Disp: 30 tablet, Rfl: 0    albuterol 108 (90 Base) MCG/ACT Inhalation Aero Soln, Inhale 2 puffs by inhalation route every 6 hours as needed for shortness of breath or wheezing., Disp: 1 each, Rfl: 0    metoprolol tartrate 50 MG Oral Tab, , Disp: , Rfl:     amLODIPine 5 MG Oral Tab, Take 1 tablet (5 mg total) by mouth daily., Disp: 90 tablet, Rfl: 3    Inclisiran Sodium (LEQVIO SC), Inject into the skin every 6 (six) months., Disp: , Rfl:     diphenhydrAMINE HCl, Sleep, (SIMPLY SLEEP) 25 MG Oral Tab, Take 1 tablet (25 mg total) by mouth nightly as needed for Sleep., Disp: , Rfl:     aspirin 81 MG Oral Tab EC, Take 1 tablet (81 mg total) by mouth daily., Disp: 90 tablet, Rfl: 3  ASSESSMENT AND PLAN    1. NEO (obstructive sleep apnea)  Mild NEO.  We did review the findings of her orthognathic evaluation which demonstrates a narrow where nasal airway which I suspect is due to the more posterior positioning of her tongue base.  I did discuss with her that the use of an oral appliance for more than likely improve this limited airway due to the fact that the entire tongue will be mobilized anteriorly.  I have asked her to simply continue using her oral appliance for now and to return to see me in about a month or 2 for reevaluation.        This note was prepared using [employer] voice recognition dictation software. As a result errors may occur. When identified these errors have been corrected. While every attempt is made to correct errors during dictation discrepancies may still exist    Shahab Baires MD    9/24/2024    6:49 PM

## 2024-09-25 NOTE — TELEPHONE ENCOUNTER
Please call pulmonary department to see if you can get her a sooner appointment.  There is a possible abnormality in her trachea and I think she is to see the pulmonologist and not a midlevel provider.

## 2024-09-25 NOTE — TELEPHONE ENCOUNTER
Patient sent [employer] message responding to Dr. Pham's result note message.    Cholesterol is wonderful.  No diabetes.  Kidney function and liver function are normal.  CBC shows no anemia.  TSH is again low and I am going to recheck your actual thyroid hormones from the blood that send lab to see if you truly have a hyperactive thyroid.   Written by Francisco Pham DO on 9/21/2024 11:26 AM CDT  Seen by patient Kaitlin Schroeder on 9/24/2024 11:47 AM    Please see [employer] message below and advise. Thank you.

## 2024-09-25 NOTE — TELEPHONE ENCOUNTER
From: Kaitlin Schroeder  To: Francisco Pham  Sent: 9/24/2024 11:56 AM CDT  Subject: Test results     I understand that my cholesterol is all at good levels. Since I have done the injection, am I to understand to discontinue my my 20 mg atorvastatin. ? Also, you advise me to get into Dr. Prieto soon as possible. I’ve only seen Sabas. The soonest appointment they have for him is December 27. Is there a possibility you can help me see him sooner or is that date good.? Thank you.   Kaitlin Schroeder   [birth year]

## 2024-09-26 NOTE — TELEPHONE ENCOUNTER
Future Appointments   Date Time Provider Department Center   10/2/2024  2:00 PM Bouchra Prieto MD [id number]     [facility] to Dr. Pham

## 2024-10-02 ENCOUNTER — OFFICE VISIT (OUTPATIENT)
Dept: PULMONOLOGY | Facility: CLINIC | Age: 64
End: 2024-10-02
Payer: COMMERCIAL

## 2024-10-02 VITALS
HEIGHT: 62.5 IN | BODY MASS INDEX: 23.33 KG/M2 | DIASTOLIC BLOOD PRESSURE: 74 MMHG | OXYGEN SATURATION: 98 % | RESPIRATION RATE: 14 BRPM | SYSTOLIC BLOOD PRESSURE: 128 MMHG | WEIGHT: 130 LBS

## 2024-10-02 DIAGNOSIS — G47.33 OSA (OBSTRUCTIVE SLEEP APNEA): ICD-10-CM

## 2024-10-02 DIAGNOSIS — R91.8 LUNG NODULES: Primary | ICD-10-CM

## 2024-10-02 DIAGNOSIS — J45.20 MILD INTERMITTENT ASTHMA WITHOUT COMPLICATION (HCC): ICD-10-CM

## 2024-10-02 PROCEDURE — 3078F DIAST BP <80 MM HG: CPT | Performed by: INTERNAL MEDICINE

## 2024-10-02 PROCEDURE — 3008F BODY MASS INDEX DOCD: CPT | Performed by: INTERNAL MEDICINE

## 2024-10-02 PROCEDURE — 3074F SYST BP LT 130 MM HG: CPT | Performed by: INTERNAL MEDICINE

## 2024-10-02 PROCEDURE — 99244 OFF/OP CNSLTJ NEW/EST MOD 40: CPT | Performed by: INTERNAL MEDICINE

## 2024-10-02 NOTE — H&P
[facility]    Pulmonary consult     Kaitlin Schroeder Patient Status:  Specimen    1960 MRN [id number]   Location [facility] Attending No att. providers found   Hosp Day # 0 PCP Francisco Pham,      Date:  10/2/2024    History provided by:patient  HPI:     Chief Complaint   Patient presents with    Consult     Had xrays for oral appliance and was told there was tracheal stenosis noted    Obstructive Sleep Apnea (NEO)     Using oral appliance now    Dyspnea     HPI    64-year-old female with history of anxiety, CVA, HTN, HL  Patient currently on aspirin and amlodipine and alprazolam on omeprazole with as needed albuterol    No history of smoking or vaping or occupational exposure  Reported occasional dyspnea and wheezes sometimes improved by albuterol and seems like she is using albuterol not very frequently  No night symptoms like wheezing or cough  Episode of anxiety and panicking worsening her dyspnea  Overall active with no significant dyspnea upon exertion  No chronic cough or sputum or hemoptysis  No history of TB or exposure  No weight loss or night sweats  GERD symptoms controlled with PPI  No postnasal drip  Insomnia and difficult to fall asleep or maintaining sleep        History     Past Medical History:    Anxiety    Cancer (HCC)    breast    Cerebrovascular disease    Colon polyps    repeat CLN in     Essential hypertension    High cholesterol    History of colposcopy    Hyperlipidemia    Lipid screening    Per [employer]    Microvascular ischemia of myocardium    Multiple allergies    Per [employer]:  \"Allergies.\"    Osteoporosis screening    Per [employer]    Silent cerebral infarction (HCC)    Stroke (HCC)     Past Surgical History:   Procedure Laterality Date    Colonoscopy  2018    Dr Contreras    Colonoscopy N/A 2023    Procedure: COLONOSCOPY;  Surgeon: PASQUALE Contreras MD;  Location: [facility]     Other surgical history Left 2016    lumpectomy     Other surgical history Right 2014    breast cyst    Other surgical history Right 1988    breast cyst     Family History   Problem Relation Age of Onset    Cancer Maternal Grandmother         breast cancer     Social History:  Social History     Socioeconomic History    Marital status:    Tobacco Use    Smoking status: Former     Current packs/day: 0.00     Types: Cigarettes     Quit date: 2011     Years since quittin.7    Smokeless tobacco: Never   Vaping Use    Vaping status: Never Used   Substance and Sexual Activity    Alcohol use: Yes     Alcohol/week: 0.0 standard drinks of alcohol     Comment: Wine, socially    Drug use: No   Other Topics Concern    Caffeine Concern Yes     Comment: (Coffee, Tea) 1 cup daily     Allergies/Medications:   Allergies: No Known Allergies  (Not in a hospital admission)      Review of Systems:     Constitutional:  Negative for fever and unexpected weight change.   HENT:  Negative for congestion and postnasal drip.    Cardiovascular: Negative.    Gastrointestinal: Negative.    Musculoskeletal: Negative.    Neurological: Negative.    Hematological: Negative.    Psychiatric/Behavioral: Negative.         Physical Exam:   Vital Signs:  Blood pressure 128/74, resp. rate 14, height 5' 2.5\" (1.588 m), weight 130 lb (59 kg), SpO2 98%, not currently breastfeeding.  Physical Exam  Constitutional:       General: She is not in acute distress.     Appearance: Normal appearance. She is not ill-appearing.   HENT:      Head: Normocephalic and atraumatic.      Right Ear: Tympanic membrane normal.      Nose: Nose normal.      Mouth/Throat:      Mouth: Mucous membranes are moist.      Comments: Normal upper airway exam with class I Mallampati score  Eyes:      General: No scleral icterus.     Conjunctiva/sclera: Conjunctivae normal.      Pupils: Pupils are equal, round, and reactive to light.   Cardiovascular:      Rate and  Rhythm: Normal rate.   Pulmonary:      Effort: Pulmonary effort is normal. No respiratory distress.      Breath sounds: Normal breath sounds. No stridor. No wheezing, rhonchi or rales.   Chest:      Chest wall: No tenderness.   Abdominal:      General: Abdomen is flat. Bowel sounds are normal. There is no distension.      Palpations: Abdomen is soft.   Musculoskeletal:      Cervical back: Normal range of motion.      Right lower leg: No edema.      Left lower leg: No edema.   Skin:     General: Skin is dry.   Neurological:      Mental Status: She is oriented to person, place, and time.           Results:     Lab Results   Component Value Date    WBC 5.9 09/19/2024    HGB 15.2 09/19/2024    HCT 45.6 09/19/2024    .0 09/19/2024    CREATSERUM 0.72 09/19/2024    BUN 12 09/19/2024     09/19/2024    K 4.8 09/19/2024     09/19/2024    CO2 28.0 09/19/2024     (H) 09/19/2024    CA 9.9 09/19/2024    ALB 4.8 09/19/2024    ALKPHO 79 09/19/2024    BILT 0.7 09/19/2024    TP 7.6 09/19/2024    AST 35 (H) 09/19/2024    ALT 30 09/19/2024    PTT 24.2 12/12/2023    INR 0.86 12/12/2023    T4F 1.4 09/19/2024    TSH 0.504 (L) 09/19/2024    ESRML 23 01/16/2024    CRP <0.40 01/16/2024    TROPHS <3 12/12/2023    ETOH <3 12/12/2023     Chest ct 7/16/2024 reviewed   FINDINGS:  CARDIAC: The heart is unchanged in size.  There are mild coronary artery calcifications.  No pericardial effusion.  MEDIASTINUM/MANNY: No mass or enlarged adenopathy.    LUNGS: Stable 11 mm ground-glass opacity in the right upper lobe (3:39).  Stable 9 mm ground-glass nodule in the right middle lobe (3:49).  There are additional skull are scattered ground-glass opacities in both lungs.  Stable 4 mm intrapulmonary lymph  node involving the right upper lobe (3:24).  Stable 2 mm right lower lobe pulmonary nodule that abuts the pleural (3:53).  There is small airways disease with scattered areas of mucous plugging in the left lower lobe (3:67) and  right middle lobe (3:63).   Minimal scattered areas of atelectasis/scarring involving both lung bases.  VASCULATURE: The main pulmonary artery is normal in caliber.  THORACIC AORTA: The ascending aorta is unchanged in caliber.  There is a 2 vessel aortic arch with common origin of the brachiocephalic artery and left common carotid artery.  Mild atherosclerotic calcification of the aortic arch.  PLEURA:   No mass or effusion.  No pneumothorax.  CHEST WALL: No axillary mass or enlarged adenopathy.  Stable bilateral breast calcifications.  There is a surgical clip in the left inner breast.  LIMITED ABDOMEN: There is colonic diverticulosis.  Trace hiatal hernia.  BONES:   No acute fracture.  No aggressive osseous lesion.  Multilevel degenerative changes of the thoracic spine.  OTHER: Stable 1.5 cm hypodense right thyroid nodule (2:11).  This nodule was further evaluated on the dedicated thyroid ultrasound.               Impression   CONCLUSION:     Stable 11 mm ground-glass opacity in the right upper lobe and stable 9 mm ground-glass opacity in the right middle lobe.  Recommend continued attention imaging.     Small airways disease with scattered areas of mucous plugging, which are most noticeable in the right middle lobe.     Lesser incidental findings described above.          Assessment/Plan:     1- lung nodules    11 mm GGO RUL     9 mm GGO RML   Stable from 1/12/2024 to 7/16/2024   No h/o smoking     Plan :  F/u chest ct in 6 months     2-mild intermittent asthma  Small airways disease on CT with mucous plugging  Clinically with mild intermittent  No history of smoking  Normal PFTs    Plan :  Albuterol as needed  Peak flow meter was given to the patient  Avoid triggers  Follow-up in 6 months if symptoms indicate more persistent we will add ICS    3- Mild NEO with AHI 10       With sleep maintenance insomnia and anxiety.    Low BMI of 23   normal upper airway exam  Did not tolerate CPAP therapy at all  Mild symptoms  , and mainly insomnia  Seen by [facility] and started with oral appliances  Reassurance  Keep regular sleep-wake cycle  Focus on insomnia and relaxation techniques      4-reported some narrowing in the nasopharyngeal and some oropharyngeal airway narrowing by [facility] evaluation  Evaluated by ENT and was thought related to posterior positioning of her tongue base and recommended to continue use of oral appliances .      F/u in 6 months       Bouchra Prieto MD  10/2/2024

## 2024-10-21 ENCOUNTER — TELEPHONE (OUTPATIENT)
Dept: FAMILY MEDICINE CLINIC | Facility: CLINIC | Age: 64
End: 2024-10-21

## 2025-02-06 ENCOUNTER — OFFICE VISIT (OUTPATIENT)
Dept: FAMILY MEDICINE CLINIC | Facility: CLINIC | Age: 65
End: 2025-02-06
Payer: COMMERCIAL

## 2025-02-06 ENCOUNTER — LAB ENCOUNTER (OUTPATIENT)
Dept: LAB | Age: 65
End: 2025-02-06
Attending: FAMILY MEDICINE
Payer: COMMERCIAL

## 2025-02-06 VITALS
BODY MASS INDEX: 24.22 KG/M2 | DIASTOLIC BLOOD PRESSURE: 67 MMHG | TEMPERATURE: 100 F | HEIGHT: 62.5 IN | HEART RATE: 74 BPM | SYSTOLIC BLOOD PRESSURE: 113 MMHG | WEIGHT: 135 LBS

## 2025-02-06 DIAGNOSIS — H61.21 IMPACTED CERUMEN OF RIGHT EAR: ICD-10-CM

## 2025-02-06 DIAGNOSIS — B34.9 ACUTE VIRAL SYNDROME: Primary | ICD-10-CM

## 2025-02-06 DIAGNOSIS — B34.9 ACUTE VIRAL SYNDROME: ICD-10-CM

## 2025-02-06 PROCEDURE — 3078F DIAST BP <80 MM HG: CPT | Performed by: FAMILY MEDICINE

## 2025-02-06 PROCEDURE — 3008F BODY MASS INDEX DOCD: CPT | Performed by: FAMILY MEDICINE

## 2025-02-06 PROCEDURE — 3074F SYST BP LT 130 MM HG: CPT | Performed by: FAMILY MEDICINE

## 2025-02-06 PROCEDURE — 87637 SARSCOV2&INF A&B&RSV AMP PRB: CPT

## 2025-02-06 PROCEDURE — 99213 OFFICE O/P EST LOW 20 MIN: CPT | Performed by: FAMILY MEDICINE

## 2025-02-06 RX ORDER — BENZONATATE 200 MG/1
200 CAPSULE ORAL 3 TIMES DAILY PRN
Qty: 30 CAPSULE | Refills: 0 | Status: SHIPPED | OUTPATIENT
Start: 2025-02-06

## 2025-02-06 NOTE — PROGRESS NOTES
2025  1:25 PM    Kaitlin Schroeder is a 64 year old female.    Chief complaint(s):   Chief Complaint   Patient presents with    Ear Pain     Pt states Tuesday     Cough    Loss Of Appetite     HPI:     Kaitlin Schroeder primary complaint is regarding as above.     Patient is a 64-year-old female who presents complaining of URI symptoms for the past 2 days.  Primarily complaining of fever, chills, body aches, nasal congestion, dry cough, headaches and right ear discomfort.  Denies any sore throat, abdominal pain.  She did have some nausea vomiting diarrhea that has resolved yesterday.      HISTORY:  Past Medical History:    Anxiety    Cancer (HCC)    breast    Cerebrovascular disease    Colon polyps    repeat CLN in     Essential hypertension    High cholesterol    History of colposcopy    Hyperlipidemia    Lipid screening    Per [employer]    Microvascular ischemia of myocardium    Multiple allergies    Per [employer]:  \"Allergies.\"    Osteoporosis screening    Per [employer]    Silent cerebral infarction (HCC)    Stroke (HCC)      Past Surgical History:   Procedure Laterality Date    Colonoscopy  2018    Dr Contreras    Colonoscopy N/A 2023    Procedure: COLONOSCOPY;  Surgeon: PASQUALE Contreras MD;  [facility]    Other surgical history Left 2016    lumpectomy     Other surgical history Right 2014    breast cyst    Other surgical history Right 1988    breast cyst      Family History   Problem Relation Age of Onset    Cancer Maternal Grandmother         breast cancer      Social History:   Social History     Socioeconomic History    Marital status:    Tobacco Use    Smoking status: Former     Current packs/day: 0.00     Types: Cigarettes     Quit date: 2011     Years since quittin.1    Smokeless tobacco: Never   Vaping Use    Vaping status: Never Used   Substance and Sexual Activity    Alcohol use: Yes     Alcohol/week: 0.0 standard drinks of alcohol     Comment: Wine, socially     Drug use: No   Other Topics Concern    Caffeine Concern Yes     Comment: (Coffee, Tea) 1 cup daily        Immunizations:   Immunization History   Administered Date(s) Administered    FLUZONE 6 months and older PFS 0.5 ml (57164) 12/02/2019   Pended Date(s) Pended    TDAP 05/23/2023    Zoster Vaccine Recombinant Adjuvanted (Shingrix) 05/23/2023       Medications (Active prior to today's visit):  Current Outpatient Medications   Medication Sig Dispense Refill    carbamide peroxide 6.5 % Otic Solution Place 5 drops into the right ear daily as needed. 15 mL 0    benzonatate 200 MG Oral Cap Take 1 capsule (200 mg total) by mouth 3 (three) times daily as needed. 30 capsule 0    Omeprazole 40 MG Oral Capsule Delayed Release       ALPRAZolam 0.25 MG Oral Tab Take 1 tablet (0.25 mg total) by mouth nightly as needed for Sleep or Anxiety. (Inform patient when ready.) 30 tablet 0    amLODIPine 5 MG Oral Tab Take 1 tablet (5 mg total) by mouth daily. 90 tablet 3    Inclisiran Sodium (LEQVIO SC) Inject into the skin every 6 (six) months.      diphenhydrAMINE HCl, Sleep, (SIMPLY SLEEP) 25 MG Oral Tab Take 1 tablet (25 mg total) by mouth nightly as needed for Sleep.      aspirin 81 MG Oral Tab EC Take 1 tablet (81 mg total) by mouth daily. 90 tablet 3    albuterol 108 (90 Base) MCG/ACT Inhalation Aero Soln Inhale 2 puffs by inhalation route every 6 hours as needed for shortness of breath or wheezing. 1 each 0       Allergies:  Allergies[1]      ROS:   Review of Systems   Constitutional:  Positive for appetite change, fatigue and fever.   HENT:  Positive for congestion and ear pain (right). Negative for sore throat.    Eyes:  Negative for visual disturbance.   Respiratory:  Positive for cough. Negative for shortness of breath.    Cardiovascular:  Negative for chest pain.   Gastrointestinal:  Positive for diarrhea, nausea and vomiting. Negative for abdominal pain.   Musculoskeletal:  Positive for myalgias. Negative for back pain.    Skin:  Negative for rash.   Neurological:  Positive for headaches. Negative for dizziness.       PHYSICAL EXAM:   VS: /67   Pulse 74   Temp 99.6 °F (37.6 °C)   Ht 5' 2.5\" (1.588 m)   Wt 135 lb (61.2 kg)   BMI 24.30 kg/m²     Physical Exam  Vitals reviewed.   Constitutional:       General: She is not in acute distress.     Appearance: Normal appearance.   HENT:      Head: Normocephalic.      Left Ear: Tympanic membrane and ear canal normal.      Ears:      Comments: Right ear ++ cerumen impaction     Nose: Congestion present.      Mouth/Throat:      Pharynx: Oropharynx is clear.   Eyes:      Conjunctiva/sclera: Conjunctivae normal.   Cardiovascular:      Rate and Rhythm: Normal rate and regular rhythm.   Pulmonary:      Effort: Pulmonary effort is normal.      Breath sounds: Normal breath sounds.   Musculoskeletal:      Cervical back: Neck supple.   Skin:     Findings: No rash.   Psychiatric:         Mood and Affect: Mood normal.         LABORATORY RESULTS:     EKG / Spirometry : -     Radiology: No results found.     ASSESSMENT/PLAN:   Assessment   Encounter Diagnoses   Name Primary?    Acute viral syndrome Yes    Impacted cerumen of right ear        MEDICATIONS:     Requested Prescriptions     Signed Prescriptions Disp Refills    carbamide peroxide 6.5 % Otic Solution 15 mL 0     Sig: Place 5 drops into the right ear daily as needed.    benzonatate 200 MG Oral Cap 30 capsule 0     Sig: Take 1 capsule (200 mg total) by mouth 3 (three) times daily as needed.           LABORATORY & ORDERS:   Orders Placed This Encounter   Procedures    SARS-CoV-2/Flu A and B/RSV by PCR ([place])        RECOMMENDATIONS given include: Patient was reassured of  her medical condition and all questions and concerns were answered. Patient was informed to please, call our office with any new or further questions or concerns that may come up in the near future. Notify Dr Stover or the [facility] if there is a deterioration  or worsening of the medical condition. Also, inform the doctor with any new symptoms or medications' side effects.  RTC for ear irrigation.     FOLLOW-UP: Schedule a follow-up visit in next week.            Orders This Visit:  Orders Placed This Encounter   Procedures    SARS-CoV-2/Flu A and B/RSV by PCR ([place])       Meds This Visit:  Requested Prescriptions     Signed Prescriptions Disp Refills    carbamide peroxide 6.5 % Otic Solution 15 mL 0     Sig: Place 5 drops into the right ear daily as needed.    benzonatate 200 MG Oral Cap 30 capsule 0     Sig: Take 1 capsule (200 mg total) by mouth 3 (three) times daily as needed.       Imaging & Referrals:  None         ARLEEN LANCE MD       [1] No Known Allergies

## 2025-02-07 LAB
FLUAV + FLUBV RNA SPEC NAA+PROBE: DETECTED
FLUAV + FLUBV RNA SPEC NAA+PROBE: NOT DETECTED
RSV RNA SPEC NAA+PROBE: NOT DETECTED
SARS-COV-2 RNA RESP QL NAA+PROBE: NOT DETECTED

## 2025-02-08 RX ORDER — OSELTAMIVIR PHOSPHATE 75 MG/1
75 CAPSULE ORAL 2 TIMES DAILY
Qty: 10 CAPSULE | Refills: 0 | Status: SHIPPED | OUTPATIENT
Start: 2025-02-08

## 2025-02-11 ENCOUNTER — OFFICE VISIT (OUTPATIENT)
Dept: FAMILY MEDICINE CLINIC | Facility: CLINIC | Age: 65
End: 2025-02-11
Payer: COMMERCIAL

## 2025-02-11 DIAGNOSIS — H61.21 IMPACTED CERUMEN OF RIGHT EAR: Primary | ICD-10-CM

## 2025-02-11 NOTE — PROGRESS NOTES
2025  10:19 AM    Kaitlin Schroeder is a 64 year old female.    Chief complaint(s): No chief complaint on file.    HPI:     Kaitlin Schroeder primary complaint is regarding ear wax.     Patient is a 64-year-old female who is following up for possible irrigation today due to cerumen impaction.  She did try the Debrox solution which reported good success of all the earwax coming out.  No new complaints today.      HISTORY:  Past Medical History:    Anxiety    Cancer (HCC)    breast    Cerebrovascular disease    Colon polyps    repeat CLN in     Essential hypertension    High cholesterol    History of colposcopy    Hyperlipidemia    Lipid screening    Per [facility]    Microvascular ischemia of myocardium    Multiple allergies    Per [facility]:  \"Allergies.\"    Osteoporosis screening    Per [facility]    Silent cerebral infarction (HCC)    Stroke (HCC)      Past Surgical History:   Procedure Laterality Date    Colonoscopy  2018    Dr Contreras    Colonoscopy N/A 2023    Procedure: COLONOSCOPY;  Surgeon: PASQUALE Contreras MD;  Location: [facility]    Other surgical history Left 2016    lumpectomy     Other surgical history Right 2014    breast cyst    Other surgical history Right 1988    breast cyst      Family History   Problem Relation Age of Onset    Cancer Maternal Grandmother         breast cancer      Social History:   Social History     Socioeconomic History    Marital status:    Tobacco Use    Smoking status: Former     Current packs/day: 0.00     Types: Cigarettes     Quit date: 2011     Years since quittin.1    Smokeless tobacco: Never   Vaping Use    Vaping status: Never Used   Substance and Sexual Activity    Alcohol use: Yes     Alcohol/week: 0.0 standard drinks of alcohol     Comment: Wine, socially    Drug use: No   Other Topics Concern    Caffeine Concern Yes     Comment: (Coffee, Tea) 1 cup daily        Immunizations:   Immunization History   Administered Date(s)  Administered    FLUZONE 6 months and older PFS 0.5 ml (00981) 12/02/2019   Pended Date(s) Pended    TDAP 05/23/2023    Zoster Vaccine Recombinant Adjuvanted (Shingrix) 05/23/2023       Medications (Active prior to today's visit):  Current Outpatient Medications   Medication Sig Dispense Refill    oseltamivir (TAMIFLU) 75 MG Oral Cap Take 1 capsule (75 mg total) by mouth 2 (two) times daily. 10 capsule 0    carbamide peroxide 6.5 % Otic Solution Place 5 drops into the right ear daily as needed. 15 mL 0    benzonatate 200 MG Oral Cap Take 1 capsule (200 mg total) by mouth 3 (three) times daily as needed. 30 capsule 0    Omeprazole 40 MG Oral Capsule Delayed Release       ALPRAZolam 0.25 MG Oral Tab Take 1 tablet (0.25 mg total) by mouth nightly as needed for Sleep or Anxiety. (Inform patient when ready.) 30 tablet 0    albuterol 108 (90 Base) MCG/ACT Inhalation Aero Soln Inhale 2 puffs by inhalation route every 6 hours as needed for shortness of breath or wheezing. 1 each 0    amLODIPine 5 MG Oral Tab Take 1 tablet (5 mg total) by mouth daily. 90 tablet 3    Inclisiran Sodium (LEQVIO SC) Inject into the skin every 6 (six) months.      diphenhydrAMINE HCl, Sleep, (SIMPLY SLEEP) 25 MG Oral Tab Take 1 tablet (25 mg total) by mouth nightly as needed for Sleep.      aspirin 81 MG Oral Tab EC Take 1 tablet (81 mg total) by mouth daily. 90 tablet 3       Allergies:  Allergies[1]      ROS:   Review of Systems   HENT:          Ear was, all resolved       PHYSICAL EXAM:   VS: There were no vitals taken for this visit.    Physical Exam  HENT:      Right Ear: Tympanic membrane and ear canal normal.      Left Ear: Tympanic membrane and ear canal normal.         LABORATORY RESULTS:   EKG / Spirometry : -     Radiology: No results found.     ASSESSMENT/PLAN:   Assessment   Encounter Diagnosis   Name Primary?    Impacted cerumen of right ear Yes       Resolved  All cleared  RTC prn         Orders This Visit:  No orders of the defined  types were placed in this encounter.      Meds This Visit:  Requested Prescriptions      No prescriptions requested or ordered in this encounter       Imaging & Referrals:  None         ARLEEN LANCE MD         [1] No Known Allergies

## 2025-03-04 ENCOUNTER — HOSPITAL ENCOUNTER (OUTPATIENT)
Dept: ULTRASOUND IMAGING | Age: 65
Discharge: HOME | End: 2025-03-04
Attending: OTOLARYNGOLOGY
Payer: COMMERCIAL

## 2025-03-04 DIAGNOSIS — E04.1 THYROID NODULE: ICD-10-CM

## 2025-03-04 PROCEDURE — 76536 US EXAM OF HEAD AND NECK: CPT | Performed by: OTOLARYNGOLOGY

## 2025-03-07 ENCOUNTER — TELEPHONE (OUTPATIENT)
Dept: OTOLARYNGOLOGY | Facility: CLINIC | Age: 65
End: 2025-03-07

## 2025-03-11 ENCOUNTER — HOSPITAL ENCOUNTER (OUTPATIENT)
Dept: CT IMAGING | Facility: HOSPITAL | Age: 65
Discharge: HOME | End: 2025-03-11
Attending: INTERNAL MEDICINE
Payer: COMMERCIAL

## 2025-03-11 DIAGNOSIS — R91.8 LUNG NODULES: ICD-10-CM

## 2025-03-11 PROCEDURE — 71250 CT THORAX DX C-: CPT | Performed by: INTERNAL MEDICINE

## 2025-03-19 ENCOUNTER — TELEPHONE (OUTPATIENT)
Dept: PULMONOLOGY | Facility: CLINIC | Age: 65
End: 2025-03-19

## 2025-03-19 NOTE — TELEPHONE ENCOUNTER
----- Message from Bouchra Prieto sent at 3/18/2025  2:07 PM CDT -----  Please inform the patient that it is her chest CT from 3/16/2025 is stable    To follow-up with me with next scheduled appointment

## 2025-03-19 NOTE — TELEPHONE ENCOUNTER
Spoke with patient and informed of Dr. Prieto's result note below. Patient inquiring about results regarding heart. Advised patient to contact cardiologist and also informed patient to keep scheduled appointment with Dr. Prieto.

## 2025-03-21 ENCOUNTER — TELEPHONE (OUTPATIENT)
Dept: OTOLARYNGOLOGY | Facility: CLINIC | Age: 65
End: 2025-03-21

## 2025-04-02 ENCOUNTER — OFFICE VISIT (OUTPATIENT)
Dept: PULMONOLOGY | Facility: CLINIC | Age: 65
End: 2025-04-02
Payer: COMMERCIAL

## 2025-04-02 VITALS
RESPIRATION RATE: 16 BRPM | SYSTOLIC BLOOD PRESSURE: 118 MMHG | HEART RATE: 81 BPM | BODY MASS INDEX: 25.4 KG/M2 | DIASTOLIC BLOOD PRESSURE: 63 MMHG | HEIGHT: 62 IN | OXYGEN SATURATION: 99 % | WEIGHT: 138 LBS

## 2025-04-02 DIAGNOSIS — R91.8 LUNG NODULES: Primary | ICD-10-CM

## 2025-04-02 DIAGNOSIS — G47.33 OSA (OBSTRUCTIVE SLEEP APNEA): ICD-10-CM

## 2025-04-02 PROCEDURE — 3008F BODY MASS INDEX DOCD: CPT | Performed by: INTERNAL MEDICINE

## 2025-04-02 PROCEDURE — 99214 OFFICE O/P EST MOD 30 MIN: CPT | Performed by: INTERNAL MEDICINE

## 2025-04-02 PROCEDURE — 3074F SYST BP LT 130 MM HG: CPT | Performed by: INTERNAL MEDICINE

## 2025-04-02 PROCEDURE — 3078F DIAST BP <80 MM HG: CPT | Performed by: INTERNAL MEDICINE

## 2025-04-02 NOTE — PROGRESS NOTES
Subjective:   Patient ID: Kaitlin Schroeder is a 64 year old female.    HPI  Overall doing very well  No significant dyspnea and very mild with exertion and uses albuterol as needed only  No fever or chills or cough or sputum  No significant daytime sleepiness or fatigue  History/Other:   Review of Systems   Constitutional: Negative.    HENT: Negative.     Eyes: Negative.    Cardiovascular: Negative.    Gastrointestinal: Negative.    Neurological: Negative.    Psychiatric/Behavioral: Negative.       Current Outpatient Medications   Medication Sig Dispense Refill    ALPRAZolam 0.25 MG Oral Tab Take 1 tablet (0.25 mg total) by mouth nightly as needed for Sleep or Anxiety. (Inform patient when ready.) 30 tablet 0    amLODIPine 5 MG Oral Tab Take 1 tablet (5 mg total) by mouth daily. 90 tablet 3    Inclisiran Sodium (LEQVIO SC) Inject into the skin every 6 (six) months.      diphenhydrAMINE HCl, Sleep, (SIMPLY SLEEP) 25 MG Oral Tab Take 1 tablet (25 mg total) by mouth nightly as needed for Sleep.      aspirin 81 MG Oral Tab EC Take 1 tablet (81 mg total) by mouth daily. 90 tablet 3    oseltamivir (TAMIFLU) 75 MG Oral Cap Take 1 capsule (75 mg total) by mouth 2 (two) times daily. 10 capsule 0    carbamide peroxide 6.5 % Otic Solution Place 5 drops into the right ear daily as needed. 15 mL 0    benzonatate 200 MG Oral Cap Take 1 capsule (200 mg total) by mouth 3 (three) times daily as needed. 30 capsule 0    Omeprazole 40 MG Oral Capsule Delayed Release       albuterol 108 (90 Base) MCG/ACT Inhalation Aero Soln Inhale 2 puffs by inhalation route every 6 hours as needed for shortness of breath or wheezing. 1 each 0     Allergies:Allergies[1]    Objective:   Physical Exam  Constitutional:       General: She is not in acute distress.     Appearance: Normal appearance.   HENT:      Head: Atraumatic.      Nose: Nose normal.      Mouth/Throat:      Mouth: Mucous membranes are moist.   Eyes:      General: No scleral  icterus.  Cardiovascular:      Rate and Rhythm: Normal rate.      Heart sounds:      No gallop.   Pulmonary:      Effort: No respiratory distress.      Breath sounds: No stridor. No wheezing, rhonchi or rales.   Abdominal:      General: Abdomen is flat. Bowel sounds are normal.      Palpations: Abdomen is soft.   Musculoskeletal:      Right lower leg: No edema.      Left lower leg: No edema.   Lymphadenopathy:      Cervical: No cervical adenopathy.   Skin:     General: Skin is dry.   Neurological:      Mental Status: She is oriented to person, place, and time.           Chest ct 3/11/25    Narrative   PROCEDURE: CT CHEST (CPT=71250)     COMPARISON: [facility], CT CHEST (CPT=71250), 7/16/2024, 10:57 AM.  [facility], CT CHEST (CPT=71250), 1/12/2024, 10:43 AM.     INDICATIONS: Pulmonary nodules.     TECHNIQUE: Multidetector CT images of the chest were obtained without non-ionic intravenous contrast material. Automated exposure control for dose reduction was used. Adjustment of the mA and/or kV was done based on the patient's size. Iterative  reconstruction technique for dose reduction was employed. Dose information was transmitted to the ACR (American College of Radiology) NRDR (National Radiology Data Registry), which includes the Dose Index Registry.       FINDINGS:  COMMENT: Evaluation of the vasculature and sybil is suboptimal in the absence of contrast infusion.  CARDIAC: The heart is borderline enlarged. Aortic annular calcifications are observed. Atherosclerotic vascular calcifications are present in the coronary vessels.  VASCULATURE: The thoracic aorta has normal-variant two-vessel configuration with a shared origin of the brachiocephalic trunk and left common carotid artery; ascending thoracic aortic ectasia is evident measuring 3.5 cm in diameter.  LUNGS/PLEURA: Nodular biapical pleuroparenchymal scarring is demonstrated. An ovoid ground-glass  nodule of the right apex measures 0.7 x 0.5 cm (series 5, image 20). An amorphous ground-glass nodule right upper lobe measures 1.1 x 1.2 cm (series 5, image   38). A perifissural nodular opacity adjacent to the minor fissure measures 1.2 x 1.3 cm (series 5, image 48).    Inhomogeneous ground-glass opacities of the right lung are also evident (series 5, image 39).  In the left upper lobe and lingula, there are multiple branching nodular opacity; the largest of these measures 0.4 x 0.8 cm and up to 0.5 x 0.9 cm in aggregate (series 5, images 27 and 29).  Perifissural nodular is present, particularly along the major fissure, measuring up to 0.4 x 0.7 cm (series 5, image 23). Additional polygonal nodules and micro nodules are present.  Minimal peripheral subpleural interstitial reticular opacities are seen in the anterior left upper lobe, tangential to the chest wall, in a pattern suggestive of postradiation fibrosis along a radiation port. No airspace consolidation, pleural effusion,   or pneumothorax is detected.    AIRWAYS: The tracheobronchial tree is without central mass or obstructing lesion.  MEDIASTINUM/MANNY: No mass or lymphadenopathy.    CHEST WALL: Postprocedural changes of the left breast are suggested. Coarse bilateral breast calcifications are evident. No axillary mass or lymphadenopathy.    LIMITED ABDOMEN: Included portions of the unenhanced upper abdomen are notable for nonspecific low density of the hepatic parenchyma which may represent underlying hepatic steatosis.    BONES: There is mild accentuation of thoracic kyphosis. Multilevel degenerative changes are seen throughout the thoracic spine associated vacuum disc phenomenon. Degenerative changes of the shoulders are present bilaterally.  OTHER: A hypoattenuating 1.3 x 1.2 cm nodule of the right thyroid nodule is seen (series 4, image 8).  A small hiatal hernia is evident.                    Impression   CONCLUSION:  1. Grossly stable appearance of  multiple ground-glass nodules. Continued long-term surveillance is recommended.     2. Numerous solid pulmonary nodules are present. Collectively, this is not significant changed.     3. Minimal postradiation fibrosis of the left upper lobe.     4. Stable-appearing right thyroid nodule.     5. Small hiatal hernia.     6. Mild ascending thoracic aortic ectasia.     7. Lesser incidental findings as above.       Assessment & Plan:   1. Lung nodules    2. NEO (obstructive sleep apnea)        1- lung nodules    Largest  ( 11 mm GGO RUL  and 9 mm GGO RML )   Stable from 1/12/2024 to 7/16/2024   Stable on chest ct 3/11/2025     No h/o smoking   Likely benign   F/u ct in 1 year         2-mild intermittent asthma  No history of smoking  Normal PFTs     Plan :  Albuterol as needed  Avoid triggers     3- Mild NEO with AHI 10       Asymptomatic and did not tolerate therapy  Doing very well and overall asymptomatic  Continue diet and exercise    Chest ct March/2026   F/u in 1 year             Meds This Visit:  Requested Prescriptions      No prescriptions requested or ordered in this encounter       Imaging & Referrals:  None         [1] No Known Allergies

## 2025-08-19 DIAGNOSIS — I10 ESSENTIAL HYPERTENSION: ICD-10-CM

## 2025-08-20 ENCOUNTER — LAB ENCOUNTER (OUTPATIENT)
Dept: LAB | Age: 65
End: 2025-08-20
Attending: SPECIALIST

## 2025-08-20 ENCOUNTER — EKG ENCOUNTER (OUTPATIENT)
Dept: LAB | Age: 65
End: 2025-08-20
Attending: SPECIALIST

## 2025-08-20 DIAGNOSIS — Z01.818 PREOP EXAMINATION: ICD-10-CM

## 2025-08-20 DIAGNOSIS — Z01.818 PREOP EXAMINATION: Primary | ICD-10-CM

## 2025-08-20 LAB
ALBUMIN SERPL-MCNC: 5 G/DL (ref 3.2–4.8)
ALBUMIN/GLOB SERPL: 2.1 (ref 1–2)
ALP LIVER SERPL-CCNC: 75 U/L (ref 50–130)
ALT SERPL-CCNC: 33 U/L (ref 10–49)
ANION GAP SERPL CALC-SCNC: 9 MMOL/L (ref 0–18)
AST SERPL-CCNC: 26 U/L (ref ?–34)
ATRIAL RATE: 64 BPM
BASOPHILS # BLD AUTO: 0.03 X10(3) UL (ref 0–0.2)
BASOPHILS NFR BLD AUTO: 0.6 %
BILIRUB SERPL-MCNC: 0.6 MG/DL (ref 0.2–1.1)
BUN BLD-MCNC: 10 MG/DL (ref 9–23)
BUN/CREAT SERPL: 13.5 (ref 10–20)
CALCIUM BLD-MCNC: 10 MG/DL (ref 8.7–10.4)
CHLORIDE SERPL-SCNC: 104 MMOL/L (ref 98–112)
CO2 SERPL-SCNC: 28 MMOL/L (ref 21–32)
CREAT BLD-MCNC: 0.74 MG/DL (ref 0.55–1.02)
DEPRECATED RDW RBC AUTO: 44.1 FL (ref 35.1–46.3)
EGFRCR SERPLBLD CKD-EPI 2021: 90 ML/MIN/1.73M2 (ref 60–?)
EOSINOPHIL # BLD AUTO: 0.18 X10(3) UL (ref 0–0.7)
EOSINOPHIL NFR BLD AUTO: 3.6 %
ERYTHROCYTE [DISTWIDTH] IN BLOOD BY AUTOMATED COUNT: 12.9 % (ref 11–15)
FASTING STATUS PATIENT QL REPORTED: NO
GLOBULIN PLAS-MCNC: 2.4 G/DL (ref 2–3.5)
GLUCOSE BLD-MCNC: 106 MG/DL (ref 70–99)
HCT VFR BLD AUTO: 42.7 % (ref 35–48)
HGB BLD-MCNC: 14.3 G/DL (ref 12–16)
IMM GRANULOCYTES # BLD AUTO: 0.01 X10(3) UL (ref 0–1)
IMM GRANULOCYTES NFR BLD: 0.2 %
LYMPHOCYTES # BLD AUTO: 1.6 X10(3) UL (ref 1–4)
LYMPHOCYTES NFR BLD AUTO: 32.1 %
MCH RBC QN AUTO: 31.4 PG (ref 26–34)
MCHC RBC AUTO-ENTMCNC: 33.5 G/DL (ref 31–37)
MCV RBC AUTO: 93.6 FL (ref 80–100)
MONOCYTES # BLD AUTO: 0.5 X10(3) UL (ref 0.1–1)
MONOCYTES NFR BLD AUTO: 10 %
NEUTROPHILS # BLD AUTO: 2.67 X10 (3) UL (ref 1.5–7.7)
NEUTROPHILS # BLD AUTO: 2.67 X10(3) UL (ref 1.5–7.7)
NEUTROPHILS NFR BLD AUTO: 53.5 %
OSMOLALITY SERPL CALC.SUM OF ELEC: 291 MOSM/KG (ref 275–295)
P AXIS: 81 DEGREES
P-R INTERVAL: 150 MS
PLATELET # BLD AUTO: 354 10(3)UL (ref 150–450)
POTASSIUM SERPL-SCNC: 4.1 MMOL/L (ref 3.5–5.1)
PROT SERPL-MCNC: 7.4 G/DL (ref 5.7–8.2)
Q-T INTERVAL: 426 MS
QRS DURATION: 70 MS
QTC CALCULATION (BEZET): 439 MS
R AXIS: 27 DEGREES
RBC # BLD AUTO: 4.56 X10(6)UL (ref 3.8–5.3)
SODIUM SERPL-SCNC: 141 MMOL/L (ref 136–145)
T AXIS: 46 DEGREES
VENTRICULAR RATE: 64 BPM
WBC # BLD AUTO: 5 X10(3) UL (ref 4–11)

## 2025-08-20 PROCEDURE — 93005 ELECTROCARDIOGRAM TRACING: CPT

## 2025-08-20 PROCEDURE — 36415 COLL VENOUS BLD VENIPUNCTURE: CPT

## 2025-08-20 PROCEDURE — 85025 COMPLETE CBC W/AUTO DIFF WBC: CPT

## 2025-08-20 PROCEDURE — 80053 COMPREHEN METABOLIC PANEL: CPT

## 2025-08-20 PROCEDURE — 93010 ELECTROCARDIOGRAM REPORT: CPT | Performed by: INTERNAL MEDICINE

## 2025-08-21 DIAGNOSIS — I10 ESSENTIAL HYPERTENSION: ICD-10-CM

## 2025-08-22 RX ORDER — AMLODIPINE BESYLATE 5 MG/1
5 TABLET ORAL DAILY
Qty: 90 TABLET | Refills: 3 | Status: SHIPPED | OUTPATIENT
Start: 2025-08-22

## 2025-08-22 RX ORDER — AMLODIPINE BESYLATE 5 MG/1
5 TABLET ORAL DAILY
Qty: 90 TABLET | Refills: 3 | OUTPATIENT
Start: 2025-08-22

## (undated) DIAGNOSIS — E78.2 MIXED HYPERLIPIDEMIA: ICD-10-CM

## (undated) DEVICE — 60 ML SYRINGE REGULAR TIP: Brand: MONOJECT

## (undated) DEVICE — MEDI-VAC NON-CONDUCTIVE SUCTION TUBING 6MM X 1.8M (6FT.) L: Brand: CARDINAL HEALTH

## (undated) DEVICE — Device: Brand: DUAL NARE NASAL CANNULAE FEMALE LUER CON 7FT O2 TUBE

## (undated) DEVICE — KIT CLEAN ENDOKIT 1.1OZ GOWNX2

## (undated) DEVICE — ETRAP POLYP TRAP

## (undated) DEVICE — KIT ENDO ORCAPOD 160/180/190

## (undated) DEVICE — SNARE ENDOSCOPIC 10MM ROUND

## (undated) DEVICE — FORCEP RADIAL JAW 4

## (undated) NOTE — LETTER
7/27/2021    Tyron Oppenheim [address]            Dear Tyron Oppenheim,      Our records indicate that you are due for an appointment for a Colonoscopy in August 2021, or shortly there after, with MELODY Rush

## (undated) NOTE — LETTER
Lilibeth Holloway,   [address]  [address] [address]  [place],  [address] [facility]       04/27/21        Patient: Markos Montgomery   YOB: 1960   Date of Visit: 4/27/2021       Dear  Dr. Carl Bradford ,      Thank you for referring Markos Montgomery to my

## (undated) NOTE — MR AVS SNAPSHOT
[address], [address]  [address] [facility]  [phone]               Thank you for choosing us for your health care visit with Julian Doyle DO.   We are glad to serve you and happy to provide you with this summary [address], [phone], [address] [facility] [address]    Hours:  24-hours Phone:  [phone]    - amoxicillin 875 MG Tabs  - Atorvastatin Calcium 20 MG Tabs  - Fluticasone Propionate 50 MCG/ACT Susp

## (undated) NOTE — MR AVS SNAPSHOT
[address], [address]  [address] [facility]  [phone]               Thank you for choosing us for your health care visit with Marianne Tatum DO.   We are glad to serve you and happy to provide you with this summary Commonly known as:  LIPITOR           Lactic Acid 10 % Lotn   Use bid to arms and legs           KELI RETINOL CORREXION NIGHT Crea   Use qhs for chest           triamcinolone acetonide 0.1 % Crea   APPLY 1 APPLICATION TOPICALLY 2 (TWO) TIMES DAILY.    Common

## (undated) NOTE — LETTER
[address]  Authorization for Surgical Operation and Procedure                                                                                           I hereby authorize Mat Randall MD, my physician and his/her assistants (if applicable), which may include medical students, residents, and/or fellows, to perform the following surgical operation/ procedure and administer such anesthesia as may be determined necessary by my physician: Operation/Procedure name (s) COLONOSCOPY on [place]   2. I recognize that during the surgical operation/procedure, unforeseen conditions may necessitate additional or different procedures than those listed above. I, therefore, further authorize and request that the above-named surgeon, assistants, or designees perform such procedures as are, in their judgment, necessary and desirable. 3.   My surgeon/physician has discussed prior to my surgery the potential benefits, risks and side effects of this procedure; the likelihood of achieving goals; and potential problems that might occur during recuperation. They also discussed reasonable alternatives to the procedure, including risks, benefits, and side effects related to the alternatives and risks related to not receiving this procedure. I have had all my questions answered and I acknowledge that no guarantee has been made as to the result that may be obtained. 4.   Should the need arise during my operation/procedure, which includes change of level of care prior to discharge, I also consent to the administration of blood and/or blood products. Further, I understand that despite careful testing and screening of blood or blood products by collecting agencies, I may still be subject to ill effects as a result of receiving a blood transfusion and/or blood products.   The following are some, but not all, of the potential risks that can occur: fever and allergic reactions, hemolytic reactions, transmission of diseases such as Hepatitis, AIDS and Cytomegalovirus (CMV) and fluid overload. In the event that I wish to have an autologous transfusion of my own blood, or a directed donor transfusion, I will discuss this with my physician. Check only if Refusing Blood or Blood Products  I understand refusal of blood or blood products as deemed necessary by my physician may have serious consequences to my condition to include possible death. I hereby assume responsibility for my refusal and release the hospital, its personnel, and my physicians from any responsibility for the consequences of my refusal.    o  Refuse   5. I authorize the use of any specimen, organs, tissues, body parts or foreign objects that may be removed from my body during the operation/procedure for diagnosis, research or teaching purposes and their subsequent disposal by hospital authorities. I also authorize the release of specimen test results and/or written reports to my treating physician on the hospital medical staff or other referring or consulting physicians involved in my care, at the discretion of the Pathologist or my treating physician. 6.   I consent to the photographing or videotaping of the operations or procedures to be performed, including appropriate portions of my body for medical, scientific, or educational purposes, provided my identity is not revealed by the pictures or by descriptive texts accompanying them. If the procedure has been photographed/videotaped, the surgeon will obtain the original picture, image, videotape or CD. The hospital will not be responsible for storage, release or maintenance of the picture, image, tape or CD.    7.   I consent to the presence of a  or observers in the operating room as deemed necessary by my physician or their designees.     8.   I recognize that in the event my procedure results in extended X-Ray/fluoroscopy time, I may develop a skin reaction. 9. If I have a Do Not Attempt Resuscitation (DNAR) order in place, that status will be suspended while in the operating room, procedural suite, and during the recovery period unless otherwise explicitly stated by me (or a person authorized to consent on my behalf). The surgeon or my attending physician will determine when the applicable recovery period ends for purposes of reinstating the DNAR order. 10. Patients having a sterilization procedure: I understand that if the procedure is successful the results will be permanent and it will therefore be impossible for me to inseminate, conceive, or bear children. I also understand that the procedure is intended to result in sterility, although the result has not been guaranteed. 11. I acknowledge that my physician has explained sedation/analgesia administration to me including the risk and benefits I consent to the administration of sedation/analgesia as may be necessary or desirable in the judgment of my physician. I CERTIFY THAT I HAVE READ AND FULLY UNDERSTAND THE ABOVE CONSENT TO OPERATION and/or OTHER PROCEDURE.     _________________________________________ _________________________________     ___________________________________  Signature of Patient     Signature of Responsible Person                   Printed Name of Responsible Person                              _________________________________________ ______________________________        ___________________________________  Signature of Witness         Date  Time         Relationship to Patient    STATEMENT OF PHYSICIAN My signature below affirms that prior to the time of the procedure; I have explained to the patient and/or his/her legal representative, the risks and benefits involved in the proposed treatment and any reasonable alternative to the proposed treatment.  I have also explained the risks and benefits involved in refusal of the proposed treatment and alternatives to the proposed treatment and have answered the patient's questions.  If I have a significant financial interest in a co-management agreement or a significant financial interest in any product or implant, or other significant relationship used in this procedure/surgery, I have disclosed this and had a discussion with my patient.     _______________________________________________________________ _____________________________  Brendia Fat of Physician)                                                                                         (Date)                                   (Time)  Patient Name: Nikki Neal    : 1960   Printed: 2023      Medical Record #: [id number]                                              Page 1 of 1